# Patient Record
Sex: FEMALE | Race: BLACK OR AFRICAN AMERICAN | NOT HISPANIC OR LATINO
[De-identification: names, ages, dates, MRNs, and addresses within clinical notes are randomized per-mention and may not be internally consistent; named-entity substitution may affect disease eponyms.]

---

## 2018-06-01 PROBLEM — Z00.00 ENCOUNTER FOR PREVENTIVE HEALTH EXAMINATION: Status: ACTIVE | Noted: 2018-06-01

## 2018-06-07 ENCOUNTER — APPOINTMENT (OUTPATIENT)
Dept: VASCULAR SURGERY | Facility: CLINIC | Age: 68
End: 2018-06-07
Payer: MEDICARE

## 2018-06-07 VITALS
HEART RATE: 73 BPM | SYSTOLIC BLOOD PRESSURE: 193 MMHG | HEIGHT: 61 IN | TEMPERATURE: 98 F | WEIGHT: 152 LBS | BODY MASS INDEX: 28.7 KG/M2 | DIASTOLIC BLOOD PRESSURE: 97 MMHG

## 2018-06-07 PROCEDURE — 99203 OFFICE O/P NEW LOW 30 MIN: CPT

## 2018-06-19 ENCOUNTER — APPOINTMENT (OUTPATIENT)
Dept: VASCULAR SURGERY | Facility: CLINIC | Age: 68
End: 2018-06-19
Payer: MEDICARE

## 2018-06-19 PROCEDURE — 93924 LWR XTR VASC STDY BILAT: CPT

## 2018-08-31 ENCOUNTER — EMERGENCY (EMERGENCY)
Facility: HOSPITAL | Age: 68
LOS: 1 days | Discharge: HOME | End: 2018-08-31
Attending: EMERGENCY MEDICINE | Admitting: EMERGENCY MEDICINE
Payer: MEDICARE

## 2018-08-31 VITALS
SYSTOLIC BLOOD PRESSURE: 154 MMHG | TEMPERATURE: 101 F | DIASTOLIC BLOOD PRESSURE: 80 MMHG | HEART RATE: 89 BPM | OXYGEN SATURATION: 100 % | RESPIRATION RATE: 16 BRPM

## 2018-08-31 VITALS
SYSTOLIC BLOOD PRESSURE: 164 MMHG | HEART RATE: 85 BPM | OXYGEN SATURATION: 100 % | RESPIRATION RATE: 18 BRPM | DIASTOLIC BLOOD PRESSURE: 84 MMHG

## 2018-08-31 LAB
ALBUMIN SERPL ELPH-MCNC: 4.2 G/DL — SIGNIFICANT CHANGE UP (ref 3.3–5)
ALP SERPL-CCNC: 163 U/L — HIGH (ref 40–120)
ALT FLD-CCNC: 34 U/L — HIGH (ref 4–33)
APPEARANCE UR: CLEAR — SIGNIFICANT CHANGE UP
AST SERPL-CCNC: 42 U/L — HIGH (ref 4–32)
BACTERIA # UR AUTO: SIGNIFICANT CHANGE UP
BASE EXCESS BLDV CALC-SCNC: 2.3 MMOL/L — SIGNIFICANT CHANGE UP
BASOPHILS # BLD AUTO: 0.06 K/UL — SIGNIFICANT CHANGE UP (ref 0–0.2)
BASOPHILS NFR BLD AUTO: 0.6 % — SIGNIFICANT CHANGE UP (ref 0–2)
BILIRUB SERPL-MCNC: 0.8 MG/DL — SIGNIFICANT CHANGE UP (ref 0.2–1.2)
BILIRUB UR-MCNC: NEGATIVE — SIGNIFICANT CHANGE UP
BLOOD GAS VENOUS - CREATININE: 1.07 MG/DL — SIGNIFICANT CHANGE UP (ref 0.5–1.3)
BLOOD UR QL VISUAL: SIGNIFICANT CHANGE UP
BUN SERPL-MCNC: 29 MG/DL — HIGH (ref 7–23)
CALCIUM SERPL-MCNC: 9.6 MG/DL — SIGNIFICANT CHANGE UP (ref 8.4–10.5)
CHLORIDE BLDV-SCNC: 102 MMOL/L — SIGNIFICANT CHANGE UP (ref 96–108)
CHLORIDE SERPL-SCNC: 93 MMOL/L — LOW (ref 98–107)
CO2 SERPL-SCNC: 23 MMOL/L — SIGNIFICANT CHANGE UP (ref 22–31)
COLOR SPEC: YELLOW — SIGNIFICANT CHANGE UP
CREAT SERPL-MCNC: 1.14 MG/DL — SIGNIFICANT CHANGE UP (ref 0.5–1.3)
EOSINOPHIL # BLD AUTO: 0.05 K/UL — SIGNIFICANT CHANGE UP (ref 0–0.5)
EOSINOPHIL NFR BLD AUTO: 0.5 % — SIGNIFICANT CHANGE UP (ref 0–6)
GAS PNL BLDV: 136 MMOL/L — SIGNIFICANT CHANGE UP (ref 136–146)
GLUCOSE BLDV-MCNC: 173 — HIGH (ref 70–99)
GLUCOSE SERPL-MCNC: 170 MG/DL — HIGH (ref 70–99)
GLUCOSE UR-MCNC: NEGATIVE — SIGNIFICANT CHANGE UP
HCO3 BLDV-SCNC: 27 MMOL/L — SIGNIFICANT CHANGE UP (ref 20–27)
HCT VFR BLD CALC: 41.3 % — SIGNIFICANT CHANGE UP (ref 34.5–45)
HCT VFR BLDV CALC: 42.4 % — SIGNIFICANT CHANGE UP (ref 34.5–45)
HGB BLD-MCNC: 14.3 G/DL — SIGNIFICANT CHANGE UP (ref 11.5–15.5)
HGB BLDV-MCNC: 13.8 G/DL — SIGNIFICANT CHANGE UP (ref 11.5–15.5)
HYALINE CASTS # UR AUTO: HIGH
IMM GRANULOCYTES # BLD AUTO: 0.04 # — SIGNIFICANT CHANGE UP
IMM GRANULOCYTES NFR BLD AUTO: 0.4 % — SIGNIFICANT CHANGE UP (ref 0–1.5)
KETONES UR-MCNC: NEGATIVE — SIGNIFICANT CHANGE UP
LACTATE BLDV-MCNC: 2.2 MMOL/L — HIGH (ref 0.5–2)
LEUKOCYTE ESTERASE UR-ACNC: NEGATIVE — SIGNIFICANT CHANGE UP
LYMPHOCYTES # BLD AUTO: 3.66 K/UL — HIGH (ref 1–3.3)
LYMPHOCYTES # BLD AUTO: 33.6 % — SIGNIFICANT CHANGE UP (ref 13–44)
MCHC RBC-ENTMCNC: 32.7 PG — SIGNIFICANT CHANGE UP (ref 27–34)
MCHC RBC-ENTMCNC: 34.6 % — SIGNIFICANT CHANGE UP (ref 32–36)
MCV RBC AUTO: 94.5 FL — SIGNIFICANT CHANGE UP (ref 80–100)
MONOCYTES # BLD AUTO: 1.04 K/UL — HIGH (ref 0–0.9)
MONOCYTES NFR BLD AUTO: 9.6 % — SIGNIFICANT CHANGE UP (ref 2–14)
MUCOUS THREADS # UR AUTO: SIGNIFICANT CHANGE UP
NEUTROPHILS # BLD AUTO: 6.04 K/UL — SIGNIFICANT CHANGE UP (ref 1.8–7.4)
NEUTROPHILS NFR BLD AUTO: 55.3 % — SIGNIFICANT CHANGE UP (ref 43–77)
NITRITE UR-MCNC: NEGATIVE — SIGNIFICANT CHANGE UP
NRBC # FLD: 0 — SIGNIFICANT CHANGE UP
PCO2 BLDV: 37 MMHG — LOW (ref 41–51)
PH BLDV: 7.46 PH — HIGH (ref 7.32–7.43)
PH UR: 6 — SIGNIFICANT CHANGE UP (ref 5–8)
PLATELET # BLD AUTO: 442 K/UL — HIGH (ref 150–400)
PMV BLD: 9.9 FL — SIGNIFICANT CHANGE UP (ref 7–13)
PO2 BLDV: 70 MMHG — HIGH (ref 35–40)
POTASSIUM BLDV-SCNC: 3.3 MMOL/L — LOW (ref 3.4–4.5)
POTASSIUM SERPL-MCNC: 3.8 MMOL/L — SIGNIFICANT CHANGE UP (ref 3.5–5.3)
POTASSIUM SERPL-SCNC: 3.8 MMOL/L — SIGNIFICANT CHANGE UP (ref 3.5–5.3)
PROT SERPL-MCNC: 9 G/DL — HIGH (ref 6–8.3)
PROT UR-MCNC: SIGNIFICANT CHANGE UP
RBC # BLD: 4.37 M/UL — SIGNIFICANT CHANGE UP (ref 3.8–5.2)
RBC # FLD: 11.9 % — SIGNIFICANT CHANGE UP (ref 10.3–14.5)
RBC CASTS # UR COMP ASSIST: SIGNIFICANT CHANGE UP (ref 0–?)
SAO2 % BLDV: 95.4 % — HIGH (ref 60–85)
SODIUM SERPL-SCNC: 135 MMOL/L — SIGNIFICANT CHANGE UP (ref 135–145)
SP GR SPEC: 1.03 — SIGNIFICANT CHANGE UP (ref 1–1.04)
SQUAMOUS # UR AUTO: SIGNIFICANT CHANGE UP
UROBILINOGEN FLD QL: SIGNIFICANT CHANGE UP
WBC # BLD: 10.89 K/UL — HIGH (ref 3.8–10.5)
WBC # FLD AUTO: 10.89 K/UL — HIGH (ref 3.8–10.5)
WBC UR QL: SIGNIFICANT CHANGE UP (ref 0–?)

## 2018-08-31 PROCEDURE — 99283 EMERGENCY DEPT VISIT LOW MDM: CPT | Mod: GC

## 2018-08-31 PROCEDURE — 71046 X-RAY EXAM CHEST 2 VIEWS: CPT | Mod: 26

## 2018-08-31 RX ORDER — SODIUM CHLORIDE 9 MG/ML
1000 INJECTION INTRAMUSCULAR; INTRAVENOUS; SUBCUTANEOUS ONCE
Qty: 0 | Refills: 0 | Status: COMPLETED | OUTPATIENT
Start: 2018-08-31 | End: 2018-08-31

## 2018-08-31 RX ORDER — ACETAMINOPHEN 500 MG
650 TABLET ORAL ONCE
Qty: 0 | Refills: 0 | Status: COMPLETED | OUTPATIENT
Start: 2018-08-31 | End: 2018-08-31

## 2018-08-31 RX ADMIN — Medication 650 MILLIGRAM(S): at 22:01

## 2018-08-31 RX ADMIN — SODIUM CHLORIDE 1000 MILLILITER(S): 9 INJECTION INTRAMUSCULAR; INTRAVENOUS; SUBCUTANEOUS at 22:01

## 2018-08-31 NOTE — ED PROVIDER NOTE - CARE PLAN
Principal Discharge DX:	Fever of unknown origin (FUO)  Assessment and plan of treatment:	1. Please follow up with your PMD In 1-3 days. Please let her know you were seen in the emergency room for fever. Bring a copy of your results. You were decided not to be treated because there is no obvious source at this time. We will follow up on your urine culture and blood cultures.  2. Please establish care with an Infectious Disease doctor (using the attached list) or Call [phone] to find a doctor affiliated with [facility] in your neighborhood & network.   3. Please return to the ED should you have any new or worsening symptoms or have any new concerns as discussed.  4. Read all attached.

## 2018-08-31 NOTE — ED PROVIDER NOTE - PLAN OF CARE
1. Please follow up with your PMD In 1-3 days. Please let her know you were seen in the emergency room for fever. Bring a copy of your results. You were decided not to be treated because there is no obvious source at this time. We will follow up on your urine culture and blood cultures.  2. Please establish care with an Infectious Disease doctor (using the attached list) or Call [phone] to find a doctor affiliated with [facility] in your neighborhood & network.   3. Please return to the ED should you have any new or worsening symptoms or have any new concerns as discussed.  4. Read all attached.

## 2018-08-31 NOTE — ED ADULT NURSE NOTE - NSIMPLEMENTINTERV_GEN_ALL_ED
Implemented All Universal Safety Interventions:  [place] to call system. Call bell, personal items and telephone within reach. Instruct patient to call for assistance. Room bathroom lighting operational. Non-slip footwear when patient is off stretcher. Physically safe environment: no spills, clutter or unnecessary equipment. Stretcher in lowest position, wheels locked, appropriate side rails in place.

## 2018-08-31 NOTE — ED PROVIDER NOTE - PROGRESS NOTE DETAILS
Woody:  patient well appearing, no focal findings on tests except mildly elevated LFT.  Follow up with PCP outpatient, recommend ID, and given return precautions.

## 2018-08-31 NOTE — ED PROVIDER NOTE - ATTENDING CONTRIBUTION TO CARE
Dr. Mckay:  I have personally performed a face to face bedside history and physical examination of this patient. I have discussed the history, examination, review of systems, assessment and plan of management with the resident. I have reviewed the electronic medical record and amended it to reflect my history, review of systems, physical exam, assessment and plan.    68F h/o HTN and gout presents with two weeks of fever, Tm 102 at home.  +somewhat more frequent urination.  Denies URI sx, cp, sob, n/v/d, dysuria, recent travel, sick contact.  Saw her PCP Dr. Coronado yesterday and had testing, told LFT somewhat elevated, pending hepatitis panel.  Also c/o intermittent left flank pain.    Exam:  - nad  - rrr  - ctab  - abd soft ntnd, no cvat    A/P  - fever  - cbc, cmp, ua, urine culture, vbg, blood cultures x 2   - cxr  - ekg

## 2018-08-31 NOTE — ED PROVIDER NOTE - PHYSICAL EXAMINATION
Tasha Krishna, DO:   Gen: Well appearing & smiling NAD  Head: NCAT  HEENT: PERRL, MMM, normal conjunctiva, anicteric, neck supple  Lung: CTAB, no adventitious sounds  CV: RRR, no murmurs  Abd: soft, NTND, no rebound or guarding, no CVAT  MSK: No edema, no visible deformities  Neuro: Ambulatory with stable gait.   Skin: Warm and dry, no evidence of rash  Psych: normal mood and affect

## 2018-08-31 NOTE — ED ADULT NURSE NOTE - OBJECTIVE STATEMENT
pt aox4 states she is having fevers for 2 weeks; followed up with pmd and states she had blood work done but is not prescribed any medications. Pt presents in no respiratory distress. Denies chest pain denies sob. Reports left flank pain.  VSS; temp 99.8 here. IV 18g placed in left ac labs and cultures sent. MD Mckay at bedside for eval. Will continue to monitor/assess

## 2018-08-31 NOTE — ED ADULT TRIAGE NOTE - CHIEF COMPLAINT QUOTE
Fevers at home x 2 weeks tmax 102.4. Denies n/v/d. Denies sick contacts, recent travel. Denies CP, SOb. Endorsing left flank pain x 3 weeks, polyuria. Hx HTN

## 2018-08-31 NOTE — ED PROVIDER NOTE - OBJECTIVE STATEMENT
Tasha Erendira, DO: 68F with hx of HTN & gout here for fever x 19 days with tmax 102.6. No recent travel. Daughter endorsing NP cough.  No HA, neck pain, chest pain, cough, abdominal pain, diarrhea, urinary symptoms, rash, myalgias, arthralgias, edema. Endorsing left flank pain x 3 weeks, polyuria.     PMD: Dr. Faustina Coronado

## 2018-09-01 LAB
SPECIMEN SOURCE: SIGNIFICANT CHANGE UP
SPECIMEN SOURCE: SIGNIFICANT CHANGE UP

## 2018-09-02 LAB
BACTERIA UR CULT: SIGNIFICANT CHANGE UP
SPECIMEN SOURCE: SIGNIFICANT CHANGE UP

## 2018-09-03 ENCOUNTER — INPATIENT (INPATIENT)
Facility: HOSPITAL | Age: 68
LOS: 3 days | Discharge: HOME | End: 2018-09-07
Attending: HOSPITALIST | Admitting: HOSPITALIST
Payer: MEDICARE

## 2018-09-03 VITALS
SYSTOLIC BLOOD PRESSURE: 141 MMHG | DIASTOLIC BLOOD PRESSURE: 73 MMHG | HEART RATE: 72 BPM | OXYGEN SATURATION: 99 % | RESPIRATION RATE: 20 BRPM | TEMPERATURE: 98 F

## 2018-09-03 DIAGNOSIS — R78.81 BACTEREMIA: ICD-10-CM

## 2018-09-03 DIAGNOSIS — I10 ESSENTIAL (PRIMARY) HYPERTENSION: ICD-10-CM

## 2018-09-03 DIAGNOSIS — R63.8 OTHER SYMPTOMS AND SIGNS CONCERNING FOOD AND FLUID INTAKE: ICD-10-CM

## 2018-09-03 DIAGNOSIS — Z29.9 ENCOUNTER FOR PROPHYLACTIC MEASURES, UNSPECIFIED: ICD-10-CM

## 2018-09-03 LAB
ALBUMIN SERPL ELPH-MCNC: 4.3 G/DL — SIGNIFICANT CHANGE UP (ref 3.3–5)
ALP SERPL-CCNC: 134 U/L — HIGH (ref 40–120)
ALT FLD-CCNC: 19 U/L — SIGNIFICANT CHANGE UP (ref 4–33)
AST SERPL-CCNC: 19 U/L — SIGNIFICANT CHANGE UP (ref 4–32)
BASE EXCESS BLDV CALC-SCNC: 5.6 MMOL/L — SIGNIFICANT CHANGE UP
BASOPHILS # BLD AUTO: 0.05 K/UL — SIGNIFICANT CHANGE UP (ref 0–0.2)
BASOPHILS NFR BLD AUTO: 0.6 % — SIGNIFICANT CHANGE UP (ref 0–2)
BILIRUB SERPL-MCNC: 0.4 MG/DL — SIGNIFICANT CHANGE UP (ref 0.2–1.2)
BLOOD GAS VENOUS - CREATININE: 1.07 MG/DL — SIGNIFICANT CHANGE UP (ref 0.5–1.3)
BUN SERPL-MCNC: 27 MG/DL — HIGH (ref 7–23)
CALCIUM SERPL-MCNC: 9.8 MG/DL — SIGNIFICANT CHANGE UP (ref 8.4–10.5)
CHLORIDE BLDV-SCNC: 102 MMOL/L — SIGNIFICANT CHANGE UP (ref 96–108)
CHLORIDE SERPL-SCNC: 96 MMOL/L — LOW (ref 98–107)
CO2 SERPL-SCNC: 28 MMOL/L — SIGNIFICANT CHANGE UP (ref 22–31)
CREAT SERPL-MCNC: 1.11 MG/DL — SIGNIFICANT CHANGE UP (ref 0.5–1.3)
EOSINOPHIL # BLD AUTO: 0.14 K/UL — SIGNIFICANT CHANGE UP (ref 0–0.5)
EOSINOPHIL NFR BLD AUTO: 1.7 % — SIGNIFICANT CHANGE UP (ref 0–6)
GAS PNL BLDV: 138 MMOL/L — SIGNIFICANT CHANGE UP (ref 136–146)
GLUCOSE BLDV-MCNC: 150 — HIGH (ref 70–99)
GLUCOSE SERPL-MCNC: 142 MG/DL — HIGH (ref 70–99)
HCO3 BLDV-SCNC: 27 MMOL/L — SIGNIFICANT CHANGE UP (ref 20–27)
HCT VFR BLD CALC: 39.4 % — SIGNIFICANT CHANGE UP (ref 34.5–45)
HCT VFR BLDV CALC: 44.4 % — SIGNIFICANT CHANGE UP (ref 34.5–45)
HGB BLD-MCNC: 13.6 G/DL — SIGNIFICANT CHANGE UP (ref 11.5–15.5)
HGB BLDV-MCNC: 14.5 G/DL — SIGNIFICANT CHANGE UP (ref 11.5–15.5)
IMM GRANULOCYTES # BLD AUTO: 0.03 # — SIGNIFICANT CHANGE UP
IMM GRANULOCYTES NFR BLD AUTO: 0.4 % — SIGNIFICANT CHANGE UP (ref 0–1.5)
LACTATE BLDV-MCNC: 1.6 MMOL/L — SIGNIFICANT CHANGE UP (ref 0.5–2)
LYMPHOCYTES # BLD AUTO: 3.64 K/UL — HIGH (ref 1–3.3)
LYMPHOCYTES # BLD AUTO: 43.4 % — SIGNIFICANT CHANGE UP (ref 13–44)
MCHC RBC-ENTMCNC: 32.8 PG — SIGNIFICANT CHANGE UP (ref 27–34)
MCHC RBC-ENTMCNC: 34.5 % — SIGNIFICANT CHANGE UP (ref 32–36)
MCV RBC AUTO: 94.9 FL — SIGNIFICANT CHANGE UP (ref 80–100)
METHOD TYPE: SIGNIFICANT CHANGE UP
MONOCYTES # BLD AUTO: 0.61 K/UL — SIGNIFICANT CHANGE UP (ref 0–0.9)
MONOCYTES NFR BLD AUTO: 7.3 % — SIGNIFICANT CHANGE UP (ref 2–14)
NEUTROPHILS # BLD AUTO: 3.92 K/UL — SIGNIFICANT CHANGE UP (ref 1.8–7.4)
NEUTROPHILS NFR BLD AUTO: 46.6 % — SIGNIFICANT CHANGE UP (ref 43–77)
NRBC # FLD: 0 — SIGNIFICANT CHANGE UP
ORGANISM # SPEC MICROSCOPIC CNT: SIGNIFICANT CHANGE UP
ORGANISM # SPEC MICROSCOPIC CNT: SIGNIFICANT CHANGE UP
PCO2 BLDV: 52 MMHG — HIGH (ref 41–51)
PH BLDV: 7.39 PH — SIGNIFICANT CHANGE UP (ref 7.32–7.43)
PLATELET # BLD AUTO: 424 K/UL — HIGH (ref 150–400)
PMV BLD: 9.9 FL — SIGNIFICANT CHANGE UP (ref 7–13)
PO2 BLDV: 29 MMHG — LOW (ref 35–40)
POTASSIUM BLDV-SCNC: 3.2 MMOL/L — LOW (ref 3.4–4.5)
POTASSIUM SERPL-MCNC: 3.5 MMOL/L — SIGNIFICANT CHANGE UP (ref 3.5–5.3)
POTASSIUM SERPL-SCNC: 3.5 MMOL/L — SIGNIFICANT CHANGE UP (ref 3.5–5.3)
PROT SERPL-MCNC: 8.4 G/DL — HIGH (ref 6–8.3)
RBC # BLD: 4.15 M/UL — SIGNIFICANT CHANGE UP (ref 3.8–5.2)
RBC # FLD: 12 % — SIGNIFICANT CHANGE UP (ref 10.3–14.5)
SAO2 % BLDV: 50.8 % — LOW (ref 60–85)
SODIUM SERPL-SCNC: 140 MMOL/L — SIGNIFICANT CHANGE UP (ref 135–145)
WBC # BLD: 8.39 K/UL — SIGNIFICANT CHANGE UP (ref 3.8–10.5)
WBC # FLD AUTO: 8.39 K/UL — SIGNIFICANT CHANGE UP (ref 3.8–10.5)

## 2018-09-03 PROCEDURE — 99223 1ST HOSP IP/OBS HIGH 75: CPT | Mod: GC

## 2018-09-03 RX ORDER — HYDROCHLOROTHIAZIDE 25 MG
25 TABLET ORAL DAILY
Qty: 0 | Refills: 0 | Status: DISCONTINUED | OUTPATIENT
Start: 2018-09-03 | End: 2018-09-04

## 2018-09-03 RX ORDER — ATENOLOL AND CHLORTHALIDONE 50; 25 MG/1; MG/1
1 TABLET ORAL
Qty: 0 | Refills: 0 | COMMUNITY

## 2018-09-03 RX ORDER — INFLUENZA VIRUS VACCINE 15; 15; 15; 15 UG/.5ML; UG/.5ML; UG/.5ML; UG/.5ML
0.5 SUSPENSION INTRAMUSCULAR ONCE
Qty: 0 | Refills: 0 | Status: DISCONTINUED | OUTPATIENT
Start: 2018-09-03 | End: 2018-09-07

## 2018-09-03 RX ORDER — PIPERACILLIN AND TAZOBACTAM 4; .5 G/20ML; G/20ML
3.38 INJECTION, POWDER, LYOPHILIZED, FOR SOLUTION INTRAVENOUS EVERY 8 HOURS
Qty: 0 | Refills: 0 | Status: DISCONTINUED | OUTPATIENT
Start: 2018-09-03 | End: 2018-09-07

## 2018-09-03 RX ORDER — PIPERACILLIN AND TAZOBACTAM 4; .5 G/20ML; G/20ML
3.38 INJECTION, POWDER, LYOPHILIZED, FOR SOLUTION INTRAVENOUS ONCE
Qty: 0 | Refills: 0 | Status: COMPLETED | OUTPATIENT
Start: 2018-09-03 | End: 2018-09-03

## 2018-09-03 RX ORDER — ENOXAPARIN SODIUM 100 MG/ML
40 INJECTION SUBCUTANEOUS DAILY
Qty: 0 | Refills: 0 | Status: DISCONTINUED | OUTPATIENT
Start: 2018-09-03 | End: 2018-09-07

## 2018-09-03 RX ORDER — ATENOLOL 25 MG/1
50 TABLET ORAL DAILY
Qty: 0 | Refills: 0 | Status: DISCONTINUED | OUTPATIENT
Start: 2018-09-03 | End: 2018-09-04

## 2018-09-03 RX ADMIN — PIPERACILLIN AND TAZOBACTAM 200 GRAM(S): 4; .5 INJECTION, POWDER, LYOPHILIZED, FOR SOLUTION INTRAVENOUS at 16:29

## 2018-09-03 RX ADMIN — PIPERACILLIN AND TAZOBACTAM 25 GRAM(S): 4; .5 INJECTION, POWDER, LYOPHILIZED, FOR SOLUTION INTRAVENOUS at 21:15

## 2018-09-03 NOTE — ED PROVIDER NOTE - OBJECTIVE STATEMENT
69 y/o F PMH HTN called back to ED today for + blood cx at 54 hours with gram negative rods. Pt was evaluated 2 days ago for FUO (tmax 102F), no fever today, yesterday tmax 100F. Pt states her sxs have gradually improved over past 2 days. States occasional np cough now resolved, urinary sxs completely resolved as well. Denies chills, bodyaches, CP, SOB, abdominal pain, N/V/D, dysuria, hematuria, frequency, ha/neck pain.

## 2018-09-03 NOTE — H&P ADULT - NSHPLABSRESULTS_GEN_ALL_CORE
09-03    140  |  96<L>  |  27<H>  ----------------------------<  142<H>  3.5   |  28  |  1.11    Ca    9.8      03 Sep 2018 16:40    TPro  8.4<H>  /  Alb  4.3  /  TBili  0.4  /  DBili  x   /  AST  19  /  ALT  19  /  AlkPhos  134<H>  09-03  TPro  9.0<H>  /  Alb  4.2  /  TBili  0.8  /  DBili  x   /  AST  42<H>  /  ALT  34<H>  /  AlkPhos  163<H>  08-31                        13.6   8.39  )-----------( 424      ( 03 Sep 2018 16:10 )             39.4             CAPILLARY BLOOD GLUCOSE

## 2018-09-03 NOTE — ED PROVIDER NOTE - MEDICAL DECISION MAKING DETAILS
67 y/o F with + blood cx after 54 hours with gram negative rods; sxs otherwise resolving  -cbc, cmp, blood cx x2, vbg

## 2018-09-03 NOTE — ED PROVIDER NOTE - ATTENDING CONTRIBUTION TO CARE
Di: 69 yo female called back for positive blood cultures. Pt seen 2 days ago in the ED for fever of unknown origin. Blood cultures were sent which today gre gómez negative rods. Pt denies cough, congestions, dysuria. + urinary frequency. NO nausea or vomiting. Pt has not had any further fevers since her d/c form the ED. Pt currently asymptomatic. EXam: GENERAL: well appearing, NAD, HEENT: MMM, CARDIO: +S1/S2, no murmurs, rubs or gallops, LUNGS: CTA B/L, no wheezing, rales or rhonchi, ABD: soft, nontender, BSx4 quadrants, no guarding or rigidity. MSK: NO CVA TTP NEURO: AxOx3, SKIN: no rashes or lesions, well perfused A/P- 69 yo female with positive blood cultures. will obtain labs, repeat cultures, give iv abx, and admit.

## 2018-09-03 NOTE — H&P ADULT - NSHPPHYSICALEXAM_GEN_ALL_CORE
Vital Signs Last 24 Hrs  T(C): 36.9 (09-03-18 @ 17:41), Max: 36.9 (09-03-18 @ 17:41)  T(F): 98.5 (09-03-18 @ 17:41), Max: 98.5 (09-03-18 @ 17:41)  HR: 65 (09-03-18 @ 17:41) (65 - 72)  BP: 143/54 (09-03-18 @ 17:41) (141/73 - 143/54)  RR: 19 (09-03-18 @ 17:41) (19 - 20)  SpO2: 100% (09-03-18 @ 17:41) (99% - 100%)    PHYSICAL EXAM:  GENERAL: NAD, well-groomed, well-developed  HEAD:  Atraumatic, Normocephalic  EYES: EOMI, PERRLA, conjunctiva and sclera clear  ENMT: No tonsillar erythema, exudates, or enlargement; Moist mucous membranes, Good dentition, No lesions  NECK: Supple, No JVD, Normal thyroid  CHEST/LUNG: Clear to percussion bilaterally; No rales, rhonchi, wheezing, or rubs  HEART: Regular rate and rhythm; No murmurs, rubs, or gallops  ABDOMEN: Soft, Nontender, Nondistended; Bowel sounds present  EXTREMITIES:  2+ Peripheral Pulses, No clubbing, cyanosis, or edema  LYMPH: No lymphadenopathy noted  SKIN: No rashes or lesions  NERVOUS SYSTEM:  Alert & Oriented X3, Good concentration; Motor Strength 5/5 B/L upper and lower extremities; DTRs 2+ intact and symmetric

## 2018-09-03 NOTE — H&P ADULT - PROBLEM SELECTOR PLAN 1
Pt called back b/c of GNR bacteremia after being discharged from the ED; pt is afebrile, w/o leukocytosis or overt physical exam findings suggestive of source  - f/u UA  - f/u BCx x2  - can consider CT A/P w/ IV contrast to look for source  - would empirically treat with Zosyn until repeat cultures are cleared

## 2018-09-03 NOTE — ED POST DISCHARGE NOTE - DETAILS
[phone] - unable to leave message; phone rings [phone] - left message to call back. [phone] - PMD office closed

## 2018-09-03 NOTE — ED ADULT NURSE NOTE - CHPI ED NUR SYMPTOMS NEG
no nausea/no tingling/no vomiting/no dizziness/no fever/no decreased eating/drinking/no weakness/no pain/no chills

## 2018-09-03 NOTE — ED ADULT NURSE NOTE - NSIMPLEMENTINTERV_GEN_ALL_ED
Implemented All Universal Safety Interventions:  [place] to call system. Call bell, personal items and telephone within reach. Instruct patient to call for assistance. Room bathroom lighting operational. Non-slip footwear when patient is off stretcher. Physically safe environment: no spills, clutter or unnecessary equipment. Stretcher in lowest position, wheels locked, appropriate side rails in place.

## 2018-09-03 NOTE — ED ADULT NURSE NOTE - OBJECTIVE STATEMENT
69 y/o female presents to ED after being called back.  Pt was seen in ED on Friday for fever of unknown origin, pt had blood cultures drawn which were called back as positive for gram neg rods.  Pt states that her original symptoms have resolved and she is feeling much better, pt offers no complaints.  IV established, labs drawn and sent, providers evaluating.

## 2018-09-03 NOTE — H&P ADULT - HISTORY OF PRESENT ILLNESS
69 y/o F PMH HTN called back to ED today for BCx at 54 hours + for GNR.   Pt was evaluated 2 days ago for fever of unclear etiology w/ TMax 102 with 19 days of fever prior to coming into the hospital; did not have fever today but had T 100 yesterday.  Pt states her sxs have gradually improved over past 2 days. Describes occasional non-productive cough now resolved, urinary sxs of L flank pain x 3 weeks and polyuria are completely resolved as well.     ROS negative    No recent travel or sick contacts    In the ED:  Vital Signs Last 24 Hrs  T(C): 36.9 (09-03-18 @ 17:41), Max: 36.9 (09-03-18 @ 17:41)  T(F): 98.5 (09-03-18 @ 17:41), Max: 98.5 (09-03-18 @ 17:41)  HR: 65 (09-03-18 @ 17:41) (65 - 72)  BP: 143/54 (09-03-18 @ 17:41) (141/73 - 143/54)  BP(mean): --  RR: 19 (09-03-18 @ 17:41) (19 - 20)  SpO2: 100% (09-03-18 @ 17:41) (99% - 100%) 67 y/o F PMH HTN called back to ED today for BCx at 54 hours + for GNR in 1 bottle.  Pt was evaluated 2 days ago in the ED for fever of unclear etiology w/ TMax 102 with 19 days of fever prior to coming into the hospital; did not have fever today but had Tmax 100 yesterday. Pt states her symptoms have gradually improved over past 2 days. Describes occasional non-productive cough which has now resolved, along with urinary symptoms consisting of L flank pain x 3 weeks and polyuria, which have now completely resolved as well. Patient denies any recent travel or sick contacts. No abdominal pain, diarrhea, dysuria, SOB, chest pain, or [place]/vomiting.    In the ED:  Vital Signs Last 24 Hrs  T(C): 36.9 (09-03-18 @ 17:41), Max: 36.9 (09-03-18 @ 17:41)  T(F): 98.5 (09-03-18 @ 17:41), Max: 98.5 (09-03-18 @ 17:41)  HR: 65 (09-03-18 @ 17:41) (65 - 72)  BP: 143/54 (09-03-18 @ 17:41) (141/73 - 143/54)  BP(mean): --  RR: 19 (09-03-18 @ 17:41) (19 - 20)  SpO2: 100% (09-03-18 @ 17:41) (99% - 100%) 67 y/o F PMH HTN called back to ED today for BCx at 54 hours + for GNR in 1 bottle.  Pt was evaluated 2 days ago in the ED for fever of unclear etiology w/ TMax 102 with 19 days of fever prior to coming into the hospital; did not have fever today but had Tmax 100 yesterday. Pt states her symptoms have gradually improved over past 2 days. Describes occasional non-productive cough which has now resolved, along with urinary symptoms consisting of L flank pain x 3 weeks and polyuria, which have now completely resolved as well. Patient denies any recent travel or sick contacts.  No recent dental work. No abdominal pain, diarrhea, dysuria, SOB, chest pain, or [place]/vomiting.    In the ED:  Vital Signs Last 24 Hrs  T(C): 36.9 (09-03-18 @ 17:41), Max: 36.9 (09-03-18 @ 17:41)  T(F): 98.5 (09-03-18 @ 17:41), Max: 98.5 (09-03-18 @ 17:41)  HR: 65 (09-03-18 @ 17:41) (65 - 72)  BP: 143/54 (09-03-18 @ 17:41) (141/73 - 143/54)  BP(mean): --  RR: 19 (09-03-18 @ 17:41) (19 - 20)  SpO2: 100% (09-03-18 @ 17:41) (99% - 100%)

## 2018-09-03 NOTE — H&P ADULT - ASSESSMENT
7 y/o F PMH HTN returning to ED today after being found to have GNR bacteremia on previous BCx drawn 2 days ago in the setting of 19 days of fever of unclear etiology 7 y/o F PMH HTN returning to ED today after being found to have GNR bacteremia on previous BCx drawn 2 days ago in the setting of 19 days of fever of unclear etiology. Possible sources of infection include GI vs UTI. 69 y/o F PMH HTN returning to ED today after being found to have GNR bacteremia on previous BCx drawn 2 days ago in the setting of 19 days of fever of unclear etiology. Possible sources of infection include GI vs UTI.

## 2018-09-04 DIAGNOSIS — E87.6 HYPOKALEMIA: ICD-10-CM

## 2018-09-04 LAB
ALBUMIN SERPL ELPH-MCNC: 3.7 G/DL — SIGNIFICANT CHANGE UP (ref 3.3–5)
ALP SERPL-CCNC: 104 U/L — SIGNIFICANT CHANGE UP (ref 40–120)
ALT FLD-CCNC: 16 U/L — SIGNIFICANT CHANGE UP (ref 4–33)
APTT BLD: 29.4 SEC — SIGNIFICANT CHANGE UP (ref 27.5–37.4)
AST SERPL-CCNC: 17 U/L — SIGNIFICANT CHANGE UP (ref 4–32)
BASOPHILS # BLD AUTO: 0.05 K/UL — SIGNIFICANT CHANGE UP (ref 0–0.2)
BASOPHILS NFR BLD AUTO: 0.6 % — SIGNIFICANT CHANGE UP (ref 0–2)
BILIRUB SERPL-MCNC: 0.8 MG/DL — SIGNIFICANT CHANGE UP (ref 0.2–1.2)
BUN SERPL-MCNC: 21 MG/DL — SIGNIFICANT CHANGE UP (ref 7–23)
CALCIUM SERPL-MCNC: 9.1 MG/DL — SIGNIFICANT CHANGE UP (ref 8.4–10.5)
CHLORIDE SERPL-SCNC: 95 MMOL/L — LOW (ref 98–107)
CO2 SERPL-SCNC: 27 MMOL/L — SIGNIFICANT CHANGE UP (ref 22–31)
CREAT SERPL-MCNC: 0.98 MG/DL — SIGNIFICANT CHANGE UP (ref 0.5–1.3)
EOSINOPHIL # BLD AUTO: 0.09 K/UL — SIGNIFICANT CHANGE UP (ref 0–0.5)
EOSINOPHIL NFR BLD AUTO: 1.1 % — SIGNIFICANT CHANGE UP (ref 0–6)
GLUCOSE SERPL-MCNC: 147 MG/DL — HIGH (ref 70–99)
HCT VFR BLD CALC: 35.4 % — SIGNIFICANT CHANGE UP (ref 34.5–45)
HGB BLD-MCNC: 12.5 G/DL — SIGNIFICANT CHANGE UP (ref 11.5–15.5)
IMM GRANULOCYTES # BLD AUTO: 0.04 # — SIGNIFICANT CHANGE UP
IMM GRANULOCYTES NFR BLD AUTO: 0.5 % — SIGNIFICANT CHANGE UP (ref 0–1.5)
INR BLD: 1.1 — SIGNIFICANT CHANGE UP (ref 0.88–1.17)
LYMPHOCYTES # BLD AUTO: 2.86 K/UL — SIGNIFICANT CHANGE UP (ref 1–3.3)
LYMPHOCYTES # BLD AUTO: 35 % — SIGNIFICANT CHANGE UP (ref 13–44)
MAGNESIUM SERPL-MCNC: 2.3 MG/DL — SIGNIFICANT CHANGE UP (ref 1.6–2.6)
MCHC RBC-ENTMCNC: 33.2 PG — SIGNIFICANT CHANGE UP (ref 27–34)
MCHC RBC-ENTMCNC: 35.3 % — SIGNIFICANT CHANGE UP (ref 32–36)
MCV RBC AUTO: 94.1 FL — SIGNIFICANT CHANGE UP (ref 80–100)
MONOCYTES # BLD AUTO: 0.79 K/UL — SIGNIFICANT CHANGE UP (ref 0–0.9)
MONOCYTES NFR BLD AUTO: 9.7 % — SIGNIFICANT CHANGE UP (ref 2–14)
NEUTROPHILS # BLD AUTO: 4.33 K/UL — SIGNIFICANT CHANGE UP (ref 1.8–7.4)
NEUTROPHILS NFR BLD AUTO: 53.1 % — SIGNIFICANT CHANGE UP (ref 43–77)
NRBC # FLD: 0 — SIGNIFICANT CHANGE UP
ORGANISM # SPEC MICROSCOPIC CNT: SIGNIFICANT CHANGE UP
ORGANISM # SPEC MICROSCOPIC CNT: SIGNIFICANT CHANGE UP
PHOSPHATE SERPL-MCNC: 3.8 MG/DL — SIGNIFICANT CHANGE UP (ref 2.5–4.5)
PLATELET # BLD AUTO: 369 K/UL — SIGNIFICANT CHANGE UP (ref 150–400)
PMV BLD: 10.1 FL — SIGNIFICANT CHANGE UP (ref 7–13)
POTASSIUM SERPL-MCNC: 2.9 MMOL/L — CRITICAL LOW (ref 3.5–5.3)
POTASSIUM SERPL-SCNC: 2.9 MMOL/L — CRITICAL LOW (ref 3.5–5.3)
PROT SERPL-MCNC: 7.5 G/DL — SIGNIFICANT CHANGE UP (ref 6–8.3)
PROTHROM AB SERPL-ACNC: 12.2 SEC — SIGNIFICANT CHANGE UP (ref 9.8–13.1)
RBC # BLD: 3.76 M/UL — LOW (ref 3.8–5.2)
RBC # FLD: 12 % — SIGNIFICANT CHANGE UP (ref 10.3–14.5)
SODIUM SERPL-SCNC: 137 MMOL/L — SIGNIFICANT CHANGE UP (ref 135–145)
SPECIMEN SOURCE: SIGNIFICANT CHANGE UP
SPECIMEN SOURCE: SIGNIFICANT CHANGE UP
WBC # BLD: 8.16 K/UL — SIGNIFICANT CHANGE UP (ref 3.8–10.5)
WBC # FLD AUTO: 8.16 K/UL — SIGNIFICANT CHANGE UP (ref 3.8–10.5)

## 2018-09-04 PROCEDURE — 99233 SBSQ HOSP IP/OBS HIGH 50: CPT | Mod: GC

## 2018-09-04 PROCEDURE — 74177 CT ABD & PELVIS W/CONTRAST: CPT | Mod: 26

## 2018-09-04 PROCEDURE — 99222 1ST HOSP IP/OBS MODERATE 55: CPT | Mod: GC

## 2018-09-04 RX ORDER — POTASSIUM CHLORIDE 20 MEQ
40 PACKET (EA) ORAL ONCE
Qty: 0 | Refills: 0 | Status: COMPLETED | OUTPATIENT
Start: 2018-09-04 | End: 2018-09-04

## 2018-09-04 RX ORDER — SODIUM CHLORIDE 9 MG/ML
1000 INJECTION INTRAMUSCULAR; INTRAVENOUS; SUBCUTANEOUS
Qty: 0 | Refills: 0 | Status: DISCONTINUED | OUTPATIENT
Start: 2018-09-04 | End: 2018-09-07

## 2018-09-04 RX ORDER — HYDROCHLOROTHIAZIDE 25 MG
25 TABLET ORAL DAILY
Qty: 0 | Refills: 0 | Status: DISCONTINUED | OUTPATIENT
Start: 2018-09-04 | End: 2018-09-07

## 2018-09-04 RX ORDER — POTASSIUM CHLORIDE 20 MEQ
40 PACKET (EA) ORAL EVERY 4 HOURS
Qty: 0 | Refills: 0 | Status: COMPLETED | OUTPATIENT
Start: 2018-09-04 | End: 2018-09-04

## 2018-09-04 RX ORDER — ATENOLOL 25 MG/1
50 TABLET ORAL DAILY
Qty: 0 | Refills: 0 | Status: DISCONTINUED | OUTPATIENT
Start: 2018-09-04 | End: 2018-09-07

## 2018-09-04 RX ADMIN — Medication 40 MILLIEQUIVALENT(S): at 21:16

## 2018-09-04 RX ADMIN — Medication 40 MILLIEQUIVALENT(S): at 14:52

## 2018-09-04 RX ADMIN — PIPERACILLIN AND TAZOBACTAM 25 GRAM(S): 4; .5 INJECTION, POWDER, LYOPHILIZED, FOR SOLUTION INTRAVENOUS at 14:52

## 2018-09-04 RX ADMIN — ATENOLOL 50 MILLIGRAM(S): 25 TABLET ORAL at 05:16

## 2018-09-04 RX ADMIN — Medication 25 MILLIGRAM(S): at 05:16

## 2018-09-04 RX ADMIN — Medication 25 MILLIGRAM(S): at 16:16

## 2018-09-04 RX ADMIN — Medication 40 MILLIEQUIVALENT(S): at 10:33

## 2018-09-04 RX ADMIN — ATENOLOL 50 MILLIGRAM(S): 25 TABLET ORAL at 11:45

## 2018-09-04 RX ADMIN — PIPERACILLIN AND TAZOBACTAM 25 GRAM(S): 4; .5 INJECTION, POWDER, LYOPHILIZED, FOR SOLUTION INTRAVENOUS at 05:16

## 2018-09-04 RX ADMIN — PIPERACILLIN AND TAZOBACTAM 25 GRAM(S): 4; .5 INJECTION, POWDER, LYOPHILIZED, FOR SOLUTION INTRAVENOUS at 21:16

## 2018-09-04 RX ADMIN — Medication 40 MILLIEQUIVALENT(S): at 17:53

## 2018-09-04 NOTE — PROGRESS NOTE ADULT - SUBJECTIVE AND OBJECTIVE BOX
Hossein Karduglas PGY1, Pager #[phone]    Patient is a 68y old  Female who presents with a chief complaint of called back for GNR bacteremia (03 Sep 2018 18:23)      SUBJECTIVE: No acute events overnight.    REVIEW OF SYSTEMS:  CONSTITUTIONAL: No fever, weight loss, or fatigue  NECK: No pain or stiffness  RESPIRATORY: No cough, wheezing, chills or hemoptysis; No shortness of breath  CARDIOVASCULAR: No chest pain, palpitations, dizziness, or leg swelling  GASTROINTESTINAL: No abdominal or epigastric pain  GENITOURINARY: No dysuria  NEUROLOGICAL: No headaches or tremors  SKIN: No itching        MEDICATIONS  (STANDING):  ATENolol  Tablet 50 milliGRAM(s) Oral daily  enoxaparin Injectable 40 milliGRAM(s) SubCutaneous daily  hydrochlorothiazide 25 milliGRAM(s) Oral daily  influenza   Vaccine 0.5 milliLiter(s) IntraMuscular once  piperacillin/tazobactam IVPB. 3.375 Gram(s) IV Intermittent every 8 hours  sodium chloride 0.9%. 1000 milliLiter(s) (50 mL/Hr) IV Continuous <Continuous>    MEDICATIONS  (PRN):        Objective:    Vitals: Vital Signs Last 24 Hrs  T(C): 37.2 (09-04-18 @ 04:55), Max: 37.8 (09-03-18 @ 21:32)  T(F): 99 (09-04-18 @ 04:55), Max: 100 (09-03-18 @ 21:32)  HR: 82 (09-04-18 @ 04:55) (65 - 82)  BP: 134/77 (09-04-18 @ 04:55) (131/79 - 144/76)  BP(mean): --  RR: 17 (09-04-18 @ 04:55) (17 - 20)  SpO2: 97% (09-04-18 @ 04:55) (96% - 100%)            I&O's Summary    03 Sep 2018 07:01  -  04 Sep 2018 06:48  --------------------------------------------------------  IN: 0 mL / OUT: 500 mL / NET: -500 mL        PHYSICAL EXAM:  GENERAL: NAD  HEAD:  Atraumatic, Normocephalic  CHEST/LUNG: Clear to auscultation bilaterally; No rales, rhonchi, or wheezing  HEART: Regular rate and rhythm; No murmurs, rubs, or gallops  ABDOMEN: Soft, nontender, nondistended  EXTREMITIES:  No edema  SKIN: No rashes or lesions  NERVOUS SYSTEM:  Alert & Oriented X3    LABS:  09-03    140  |  96<L>  |  27<H>  ----------------------------<  142<H>  3.5   |  28  |  1.11    Ca    9.8      03 Sep 2018 16:40    TPro  8.4<H>  /  Alb  4.3  /  TBili  0.4  /  DBili  x   /  AST  19  /  ALT  19  /  AlkPhos  134<H>  09-03                                          13.6   8.39  )-----------( 424      ( 03 Sep 2018 16:10 )             39.4     CAPILLARY BLOOD GLUCOSE        RADIOLOGY:  Imaging Personally Reviewed: yes      Consultants involved in case: yes  Consultant(s) Notes Reviewed:  yes  Care Discussed with Consultants/Other Providers       Hossein Vargas, PGY-1 Hossein Hernánduglas PGY1, Pager #[phone]    Patient is a 68y old  Female who presents with a chief complaint of called back for GNR bacteremia (03 Sep 2018 18:23)      SUBJECTIVE: No acute events overnight. Patient with no complaints this AM. No diarrhea, nausea, or vomiting.     REVIEW OF SYSTEMS:  CONSTITUTIONAL: No fever, weight loss, or fatigue  NECK: No pain or stiffness  RESPIRATORY: No cough, wheezing, chills or hemoptysis; No shortness of breath  CARDIOVASCULAR: No chest pain, palpitations, dizziness, or leg swelling  GASTROINTESTINAL: No abdominal or epigastric pain  GENITOURINARY: No dysuria  NEUROLOGICAL: No headaches or tremors  SKIN: No itching        MEDICATIONS  (STANDING):  ATENolol  Tablet 50 milliGRAM(s) Oral daily  enoxaparin Injectable 40 milliGRAM(s) SubCutaneous daily  hydrochlorothiazide 25 milliGRAM(s) Oral daily  influenza   Vaccine 0.5 milliLiter(s) IntraMuscular once  piperacillin/tazobactam IVPB. 3.375 Gram(s) IV Intermittent every 8 hours  sodium chloride 0.9%. 1000 milliLiter(s) (50 mL/Hr) IV Continuous <Continuous>    MEDICATIONS  (PRN):        Objective:    Vitals: Vital Signs Last 24 Hrs  T(C): 37.2 (09-04-18 @ 04:55), Max: 37.8 (09-03-18 @ 21:32)  T(F): 99 (09-04-18 @ 04:55), Max: 100 (09-03-18 @ 21:32)  HR: 82 (09-04-18 @ 04:55) (65 - 82)  BP: 134/77 (09-04-18 @ 04:55) (131/79 - 144/76)  BP(mean): --  RR: 17 (09-04-18 @ 04:55) (17 - 20)  SpO2: 97% (09-04-18 @ 04:55) (96% - 100%)            I&O's Summary    03 Sep 2018 07:01  -  04 Sep 2018 06:48  --------------------------------------------------------  IN: 0 mL / OUT: 500 mL / NET: -500 mL        PHYSICAL EXAM:  GENERAL: NAD  HEAD:  Atraumatic, Normocephalic  CHEST/LUNG: Clear to auscultation bilaterally; No rales, rhonchi, or wheezing  HEART: Regular rate and rhythm; No murmurs, rubs, or gallops  ABDOMEN: Soft, nontender, nondistended  EXTREMITIES:  No edema  SKIN: No rashes or lesions  NERVOUS SYSTEM:  Alert & Oriented X3    LABS:  09-03    140  |  96<L>  |  27<H>  ----------------------------<  142<H>  3.5   |  28  |  1.11    Ca    9.8      03 Sep 2018 16:40    TPro  8.4<H>  /  Alb  4.3  /  TBili  0.4  /  DBili  x   /  AST  19  /  ALT  19  /  AlkPhos  134<H>  09-03                                          13.6   8.39  )-----------( 424      ( 03 Sep 2018 16:10 )             39.4     CAPILLARY BLOOD GLUCOSE        RADIOLOGY:  Imaging Personally Reviewed: yes      Consultants involved in case: yes  Consultant(s) Notes Reviewed:  yes  Care Discussed with Consultants/Other Providers       Hossein Vargas, PGY-1

## 2018-09-04 NOTE — PROGRESS NOTE ADULT - ATTENDING COMMENTS
68F p/w flank pain and several weeks of fevers 8/31 in ED, called in for positive blood culture  --f/u ID of bacteria in blood, continue zosyn for now  --MRCP to w/u dialted bile duct  --consider ID consult

## 2018-09-04 NOTE — CONSULT NOTE ADULT - SUBJECTIVE AND OBJECTIVE BOX
HPI: 69 y/o F PMH HTN, gout called back by ED for bacteremia. Pt states she has been having fevers up to 102F for past three weeks. She saw a PCP and was dx w gout flare in Left great toe which was erythematous, TTP. She was given steroids and toe improved; still w some ttp but no erythema. However her fevers did not improve. Pt states she did not receive abx. States as fevers persisted she came into ED 8/31 for eval. In ED, BCx were drawn and she was d/c wo abx. Pt called back when BCx grew GNR and gram variable rods at 54 hrs in 1/4 bottles. Denies dysuria, back pain, abd pain, n/v.    Denies cough, rhinorrhea, sore throat, cp, sob. 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 Denies travel, sick contacts. Has had mosquito bites but denies tick/animal bites. No hiking.       In the ED:  Vital Signs Last 24 Hrs  T(C): 36.9 (09-03-18 @ 17:41), Max: 36.9 (09-03-18 @ 17:41)  T(F): 98.5 (09-03-18 @ 17:41), Max: 98.5 (09-03-18 @ 17:41)  HR: 65 (09-03-18 @ 17:41) (65 - 72)  BP: 143/54 (09-03-18 @ 17:41) (141/73 - 143/54)  BP(mean): --  RR: 19 (09-03-18 @ 17:41) (19 - 20)  SpO2: 100% (09-03-18 @ 17:41) (99% - 100%) (03 Sep 2018 18:23)      PAST MEDICAL & SURGICAL HISTORY:  HTN (hypertension)  No significant past surgical history      Allergies    No Known Allergies    Intolerances        ANTIMICROBIALS:  piperacillin/tazobactam IVPB. 3.375 every 8 hours      OTHER MEDS:  ATENolol  Tablet 50 milliGRAM(s) Oral daily  enoxaparin Injectable 40 milliGRAM(s) SubCutaneous daily  hydrochlorothiazide 25 milliGRAM(s) Oral daily  influenza   Vaccine 0.5 milliLiter(s) IntraMuscular once  potassium chloride    Tablet ER 40 milliEquivalent(s) Oral every 4 hours  sodium chloride 0.9%. 1000 milliLiter(s) IV Continuous <Continuous>      SOCIAL HISTORY:    Substance Use (street drugs): denies  Tobacco Usage:  denies  Alcohol Usage: denies    FAMILY HISTORY:  No pertinent family history in first degree relatives      ROS:  All other systems negative     Constitutional: fever,  chills, no weight loss, no night sweats  Eye: no eye pain, no redness, no vision changes  ENT:  no sore throat, no rhinorrhea  Cardiovascular:  no chest pain, no palpitation  Respiratory:  no SOB, no cough  GI:  no abd pain, no vomiting, no diarrhea  urinary: no dysuria, no hematuria, no flank pain  : no  discharge or bleeding  musculoskeletal:  no joint pain, no joint swelling  skin:  no rash  neurology:  no headache, no seizure, no change in mental status  psych: no anxiety, no depression     Physical Exam:    General:    NAD, non toxic  Head: atraumatic, normocephalic  Eyes: normal sclera and conjunctiva  ENT:   no oropharyngeal lesions, no LAD, neck supple  Cardio:    regular S1,S2  Respiratory:   clear b/l, no wheezing  abd:   soft, BS +, not tender, no hepatosplenomegaly  :     no CVAT, no suprapubic tenderness, no friedman  Musculoskeletal : L great toe ttp and warm to touch but no erythema  Skin:    no rash  vascular: no lines, normal pulses  Neurologic:     no focal deficits  psych: normal affect, no suicidal ideation      Drug Dosing Weight  Height (cm): 157.48 (03 Sep 2018 19:24)  Weight (kg): 68.4 (03 Sep 2018 19:24)  BMI (kg/m2): 27.6 (03 Sep 2018 19:24)  BSA (m2): 1.7 (03 Sep 2018 19:24)    Vital Signs Last 24 Hrs  T(F): 98.9 (09-04-18 @ 15:51), Max: 100.6 (08-31-18 @ 20:26)    Vital Signs Last 24 Hrs  HR: 74 (09-04-18 @ 15:51) (70 - 82)  BP: 137/88 (09-04-18 @ 15:51) (114/69 - 144/76)  RR: 18 (09-04-18 @ 15:51)  SpO2: 97% (09-04-18 @ 15:51) (96% - 99%)  Wt(kg): --                          12.5   8.16  )-----------( 369      ( 04 Sep 2018 07:00 )             35.4       09-04    137  |  95<L>  |  21  ----------------------------<  147<H>  2.9<LL>   |  27  |  0.98    Ca    9.1      04 Sep 2018 07:00  Phos  3.8     09-04  Mg     2.3     09-04    TPro  7.5  /  Alb  3.7  /  TBili  0.8  /  DBili  x   /  AST  17  /  ALT  16  /  AlkPhos  104  09-04          MICROBIOLOGY:  v  BLOOD PERIPHERAL  09-03-18 --  --  --      URINE MIDSTREAM  09-01-18 --  --  --      BLOOD PERIPHERAL  08-31-18 --  --  BLOOD CULTURE PCR  GRAM VARIABLE RODS      RADIOLOGY:  CXR- Clear lungs.(8/31)  CT a/p-Dilated CBD and mild intrahepatic biliary dilatation; suggest MRI and/or   GI consultation.Porcelain gallbladder.

## 2018-09-04 NOTE — PROGRESS NOTE ADULT - PROBLEM SELECTOR PLAN 1
Pt called back b/c of GNR bacteremia after being discharged from the ED; pt is afebrile, w/o leukocytosis or overt physical exam findings suggestive of source  - f/u UA  - f/u BCx x2  - can consider CT A/P w/ IV contrast to look for source  - c/w Zosyn (day 2) until repeat cultures are cleared Pt called back b/c of GNR bacteremia after being discharged from the ED; pt is afebrile, w/o leukocytosis or overt physical exam findings suggestive of source  - f/u UA  - f/u BCx x2  - CT A/P w/ IV contrast to look for GI source  - c/w Zosyn (day 2) until repeat cultures are cleared  -TTE ordered to r/o endocarditis

## 2018-09-04 NOTE — CONSULT NOTE ADULT - ASSESSMENT
69 y/o F PMH HTN, gout p/w fever 2/2 GNR and gram variable lucrecia bacteremia, concern for abd source.    - UA from 8/31 negative  - CT a/p- dilated CBD, porcelain GB  - agree w MRCP  - follow up repeat BCx  - c/w zosyn at current dose

## 2018-09-05 ENCOUNTER — TRANSCRIPTION ENCOUNTER (OUTPATIENT)
Age: 68
End: 2018-09-05

## 2018-09-05 DIAGNOSIS — K82.8 OTHER SPECIFIED DISEASES OF GALLBLADDER: ICD-10-CM

## 2018-09-05 LAB
APPEARANCE UR: CLEAR — SIGNIFICANT CHANGE UP
BACTERIA # UR AUTO: NEGATIVE — SIGNIFICANT CHANGE UP
BACTERIA BLD CULT: SIGNIFICANT CHANGE UP
BASOPHILS # BLD AUTO: 0.04 K/UL — SIGNIFICANT CHANGE UP (ref 0–0.2)
BASOPHILS NFR BLD AUTO: 0.4 % — SIGNIFICANT CHANGE UP (ref 0–2)
BILIRUB UR-MCNC: NEGATIVE — SIGNIFICANT CHANGE UP
BLOOD UR QL VISUAL: NEGATIVE — SIGNIFICANT CHANGE UP
BUN SERPL-MCNC: 18 MG/DL — SIGNIFICANT CHANGE UP (ref 7–23)
CALCIUM SERPL-MCNC: 9.5 MG/DL — SIGNIFICANT CHANGE UP (ref 8.4–10.5)
CHLORIDE SERPL-SCNC: 98 MMOL/L — SIGNIFICANT CHANGE UP (ref 98–107)
CO2 SERPL-SCNC: 25 MMOL/L — SIGNIFICANT CHANGE UP (ref 22–31)
COLOR SPEC: YELLOW — SIGNIFICANT CHANGE UP
CREAT SERPL-MCNC: 0.93 MG/DL — SIGNIFICANT CHANGE UP (ref 0.5–1.3)
EOSINOPHIL # BLD AUTO: 0.06 K/UL — SIGNIFICANT CHANGE UP (ref 0–0.5)
EOSINOPHIL NFR BLD AUTO: 0.6 % — SIGNIFICANT CHANGE UP (ref 0–6)
GLUCOSE SERPL-MCNC: 179 MG/DL — HIGH (ref 70–99)
GLUCOSE UR-MCNC: 200 — SIGNIFICANT CHANGE UP
HCT VFR BLD CALC: 35.5 % — SIGNIFICANT CHANGE UP (ref 34.5–45)
HGB BLD-MCNC: 12.6 G/DL — SIGNIFICANT CHANGE UP (ref 11.5–15.5)
HYALINE CASTS # UR AUTO: NEGATIVE — SIGNIFICANT CHANGE UP
IMM GRANULOCYTES # BLD AUTO: 0.05 # — SIGNIFICANT CHANGE UP
IMM GRANULOCYTES NFR BLD AUTO: 0.5 % — SIGNIFICANT CHANGE UP (ref 0–1.5)
KETONES UR-MCNC: NEGATIVE — SIGNIFICANT CHANGE UP
LEUKOCYTE ESTERASE UR-ACNC: NEGATIVE — SIGNIFICANT CHANGE UP
LYMPHOCYTES # BLD AUTO: 3.2 K/UL — SIGNIFICANT CHANGE UP (ref 1–3.3)
LYMPHOCYTES # BLD AUTO: 30.8 % — SIGNIFICANT CHANGE UP (ref 13–44)
MAGNESIUM SERPL-MCNC: 2.3 MG/DL — SIGNIFICANT CHANGE UP (ref 1.6–2.6)
MCHC RBC-ENTMCNC: 34.1 PG — HIGH (ref 27–34)
MCHC RBC-ENTMCNC: 35.5 % — SIGNIFICANT CHANGE UP (ref 32–36)
MCV RBC AUTO: 96.2 FL — SIGNIFICANT CHANGE UP (ref 80–100)
MONOCYTES # BLD AUTO: 0.94 K/UL — HIGH (ref 0–0.9)
MONOCYTES NFR BLD AUTO: 9.1 % — SIGNIFICANT CHANGE UP (ref 2–14)
NEUTROPHILS # BLD AUTO: 6.09 K/UL — SIGNIFICANT CHANGE UP (ref 1.8–7.4)
NEUTROPHILS NFR BLD AUTO: 58.6 % — SIGNIFICANT CHANGE UP (ref 43–77)
NITRITE UR-MCNC: NEGATIVE — SIGNIFICANT CHANGE UP
NRBC # FLD: 0 — SIGNIFICANT CHANGE UP
ORGANISM # SPEC MICROSCOPIC CNT: SIGNIFICANT CHANGE UP
PH UR: 6.5 — SIGNIFICANT CHANGE UP (ref 5–8)
PHOSPHATE SERPL-MCNC: 3.1 MG/DL — SIGNIFICANT CHANGE UP (ref 2.5–4.5)
PLATELET # BLD AUTO: 353 K/UL — SIGNIFICANT CHANGE UP (ref 150–400)
PMV BLD: 9.4 FL — SIGNIFICANT CHANGE UP (ref 7–13)
POTASSIUM SERPL-MCNC: 4.6 MMOL/L — SIGNIFICANT CHANGE UP (ref 3.5–5.3)
POTASSIUM SERPL-SCNC: 4.6 MMOL/L — SIGNIFICANT CHANGE UP (ref 3.5–5.3)
PROT UR-MCNC: SIGNIFICANT CHANGE UP
RBC # BLD: 3.69 M/UL — LOW (ref 3.8–5.2)
RBC # FLD: 12 % — SIGNIFICANT CHANGE UP (ref 10.3–14.5)
RBC CASTS # UR COMP ASSIST: SIGNIFICANT CHANGE UP (ref 0–?)
SODIUM SERPL-SCNC: 136 MMOL/L — SIGNIFICANT CHANGE UP (ref 135–145)
SP GR SPEC: 1.03 — SIGNIFICANT CHANGE UP (ref 1–1.04)
SQUAMOUS # UR AUTO: SIGNIFICANT CHANGE UP
UROBILINOGEN FLD QL: NORMAL — SIGNIFICANT CHANGE UP
WBC # BLD: 10.38 K/UL — SIGNIFICANT CHANGE UP (ref 3.8–10.5)
WBC # FLD AUTO: 10.38 K/UL — SIGNIFICANT CHANGE UP (ref 3.8–10.5)
WBC UR QL: SIGNIFICANT CHANGE UP (ref 0–?)

## 2018-09-05 PROCEDURE — 74181 MRI ABDOMEN W/O CONTRAST: CPT | Mod: 26

## 2018-09-05 PROCEDURE — 99222 1ST HOSP IP/OBS MODERATE 55: CPT | Mod: GC

## 2018-09-05 PROCEDURE — 99233 SBSQ HOSP IP/OBS HIGH 50: CPT | Mod: GC

## 2018-09-05 RX ORDER — ACETAMINOPHEN 500 MG
650 TABLET ORAL EVERY 6 HOURS
Qty: 0 | Refills: 0 | Status: DISCONTINUED | OUTPATIENT
Start: 2018-09-05 | End: 2018-09-07

## 2018-09-05 RX ADMIN — ATENOLOL 50 MILLIGRAM(S): 25 TABLET ORAL at 05:06

## 2018-09-05 RX ADMIN — PIPERACILLIN AND TAZOBACTAM 25 GRAM(S): 4; .5 INJECTION, POWDER, LYOPHILIZED, FOR SOLUTION INTRAVENOUS at 14:45

## 2018-09-05 RX ADMIN — Medication 25 MILLIGRAM(S): at 05:06

## 2018-09-05 RX ADMIN — Medication 650 MILLIGRAM(S): at 23:38

## 2018-09-05 RX ADMIN — PIPERACILLIN AND TAZOBACTAM 25 GRAM(S): 4; .5 INJECTION, POWDER, LYOPHILIZED, FOR SOLUTION INTRAVENOUS at 22:15

## 2018-09-05 RX ADMIN — SODIUM CHLORIDE 50 MILLILITER(S): 9 INJECTION INTRAMUSCULAR; INTRAVENOUS; SUBCUTANEOUS at 22:15

## 2018-09-05 RX ADMIN — PIPERACILLIN AND TAZOBACTAM 25 GRAM(S): 4; .5 INJECTION, POWDER, LYOPHILIZED, FOR SOLUTION INTRAVENOUS at 05:06

## 2018-09-05 NOTE — DISCHARGE NOTE ADULT - HOSPITAL COURSE
69 yo F w/ PMH HTN initially presented for several weeks of persistent fevers and called back to hospital for admission after being found to have GVR on BCx. Patient was empirically started on zosyn. CT A/P was performed for source identification, and showed a dilated CBD. MRCP showed cholelithiasis and porcelain gallbladder, but no choledocholithiasis. Gen Surg was consulted for porcelain gallbladder and patient will f/u for elective lap lgoan. Repeat BCx were clear. 69 yo F w/ PMH HTN initially presented for several weeks of persistent fevers and called back to hospital for admission after being found to have GVR on BCx. Patient was empirically started on zosyn. CT A/P was performed for source identification, and showed a dilated CBD and porcelain gallbladder. MRCP showed cholelithiasis, but no choledocholithiasis. Gen Surg was consulted for porcelain gallbladder and patient will f/u for elective lap logan. Repeat BCx were clear. Patient will be discharged on 5 day course of Augmentin. Patient stable and ready for discharge.

## 2018-09-05 NOTE — PROGRESS NOTE ADULT - PROBLEM SELECTOR PLAN 1
Pt called back b/c of GNR bacteremia after being discharged from the ED; pt is afebrile, w/o leukocytosis or overt physical exam findings suggestive of source  - f/u UA  - f/u BCx x2  - CT A/P showed dilated CBD and porcelain gallbladder  - f/u GI recs  - c/w Zosyn (day 3) until repeat cultures are cleared  - TTE ordered to r/o endocarditis

## 2018-09-05 NOTE — CONSULT NOTE ADULT - SUBJECTIVE AND OBJECTIVE BOX
Chief Complaint:  Patient is a 68y old  Female who presents with a chief complaint of called back for GNR bacteremia (05 Sep 2018 06:56)      HPI:  67 y/o F PMH HTN called back to ED today for BCx at 54 hours + for GNR in 1 bottle.  Pt was evaluated 2 days ago in the ED for fever of unclear etiology w/ TMax 102 with 19 days of fever prior to coming into the hospital; did not have fever today but had Tmax 100 yesterday. Pt states her symptoms have gradually improved over past 2 days. Describes occasional non-productive cough which has now resolved, along with urinary symptoms consisting of L flank pain x 3 weeks and polyuria, which have now completely resolved as well. Patient denies any recent travel or sick contacts.  No recent dental work. No abdominal pain, diarrhea, dysuria, SOB, chest pain, or naseau/vomiting.    In the ED:  Vital Signs Last 24 Hrs  T(C): 36.9 (09-03-18 @ 17:41), Max: 36.9 (09-03-18 @ 17:41)  T(F): 98.5 (09-03-18 @ 17:41), Max: 98.5 (09-03-18 @ 17:41)  HR: 65 (09-03-18 @ 17:41) (65 - 72)  BP: 143/54 (09-03-18 @ 17:41) (141/73 - 143/54)  BP(mean): --  RR: 19 (09-03-18 @ 17:41) (19 - 20)  SpO2: 100% (09-03-18 @ 17:41) (99% - 100%)    Allergies:  No Known Allergies      Home Medications:   Outpatient Medication Status not yet specified    Hospital Medications:  ATENolol  Tablet 50 milliGRAM(s) Oral daily  enoxaparin Injectable 40 milliGRAM(s) SubCutaneous daily  hydrochlorothiazide 25 milliGRAM(s) Oral daily  influenza   Vaccine 0.5 milliLiter(s) IntraMuscular once  piperacillin/tazobactam IVPB. 3.375 Gram(s) IV Intermittent every 8 hours  sodium chloride 0.9%. 1000 milliLiter(s) IV Continuous <Continuous>      PMHX/PSHX:  HTN (hypertension)  No pertinent past medical history  No significant past surgical history      Family history:  No pertinent family history in first degree relatives      Social History:     ROS:     General:  No wt loss, fevers, chills, night sweats, fatigue,   Eyes:  Good vision, no reported pain  ENT:  No sore throat, pain, runny nose, dysphagia  CV:  No pain, palpitations, hypo/hypertension  Resp:  No dyspnea, cough, tachypnea, wheezing  GI:  See HPI  :  No pain, bleeding, incontinence, nocturia  Muscle:  No pain, weakness  Neuro:  No weakness, tingling, memory problems  Psych:  No fatigue, insomnia, mood problems, depression  Endocrine:  No polyuria, polydipsia, cold/heat intolerance  Heme:  No petechiae, ecchymosis, easy bruisability  Skin:  No rash, edema      PHYSICAL EXAM:     GENERAL:  Appears stated age, well-groomed, well-nourished, no distress  HEENT:  NC/AT,  conjunctivae clear and pink,  no JVD  CHEST:  Full & symmetric excursion, no increased effort, breath sounds clear  HEART:  Regular rhythm, S1, S2, no murmur/rub/S3/S4, no abdominal bruit, no edema  ABDOMEN:  Soft, non-tender, non-distended, normoactive bowel sounds,  no masses ,  EXTREMITIES:  no cyanosis,clubbing or edema  SKIN:  No rash/erythema/ecchymoses/petechiae/wounds/abscess/warm/dry  NEURO:  Alert, oriented    Vital Signs:  Vital Signs Last 24 Hrs  T(C): 37.2 (05 Sep 2018 04:55), Max: 37.5 (04 Sep 2018 21:15)  T(F): 98.9 (05 Sep 2018 04:55), Max: 99.5 (04 Sep 2018 21:15)  HR: 81 (05 Sep 2018 04:55) (72 - 81)  BP: 109/78 (05 Sep 2018 04:55) (109/78 - 137/88)  BP(mean): --  RR: 17 (05 Sep 2018 04:55) (17 - 18)  SpO2: 95% (05 Sep 2018 04:55) (95% - 99%)  Daily     Daily     LABS:                        12.5   8.16  )-----------( 369      ( 04 Sep 2018 07:00 )             35.4     09-04    137  |  95<L>  |  21  ----------------------------<  147<H>  2.9<LL>   |  27  |  0.98    Ca    9.1      04 Sep 2018 07:00  Phos  3.8     09-04  Mg     2.3     09-04    TPro  7.5  /  Alb  3.7  /  TBili  0.8  /  DBili  x   /  AST  17  /  ALT  16  /  AlkPhos  104  09-04    LIVER FUNCTIONS - ( 04 Sep 2018 07:00 )  Alb: 3.7 g/dL / Pro: 7.5 g/dL / ALK PHOS: 104 u/L / ALT: 16 u/L / AST: 17 u/L / GGT: x           PT/INR - ( 04 Sep 2018 07:00 )   PT: 12.2 SEC;   INR: 1.10          PTT - ( 04 Sep 2018 07:00 )  PTT:29.4 SEC        Imaging:    < from: CT Abdomen and Pelvis w/ IV Cont (09.04.18 @ 12:25) >  FINDINGS:    LOWER CHEST: Within normal limits.     LIVER: Within normal limits.   BILE DUCTS: Dilated common bile duct measuring up to 15 mm. Mild   intrahepatic biliary dilatation. No discrete mass or stricture.  GALLBLADDER: Porcelain gallbladder.  SPLEEN: Within normal limits.   PANCREAS: Within normal limits.  ADRENALS: Within normal limits.   KIDNEYS/URETERS: Within normallimits.     BLADDER: Within normal limits.   REPRODUCTIVE ORGANS: Fibroid uterus.    BOWEL: No bowel obstruction. The appendix is normal.  Small hiatal hernia   versus potion diverticulum.      PERITONEUM: No ascites.   VESSELS:  Within normal limits.  RETROPERITONEUM: No lymphadenopathy.    ABDOMINAL WALL: Small umbilical hernia. Small fat-containing ventral   hernia in the upper abdomen.  BONES: Within normal limits.    IMPRESSION:   Dilated CBD and mild intrahepatic biliary dilatation; suggestMRI and/or   GI consultation.    Porcelain gallbladder.        < end of copied text > Chief Complaint:  Patient is a 68y old  Female who presents with a chief complaint of called back for GNR bacteremia (05 Sep 2018 06:56)      HPI:  68 year old female with HTN and arthritis presented to the ER with chief complain of fever for 3 weeks.     As per the patient, she has had fever for 3 weeks, on and off, upto 103 degrees. She went to the ER and was discharged since the workup was negative. She later received a call that her blood cultures at 54 hours were positive for gram variable rods in 1 bottle and thus she was asked to come back to the hosp for further workup. She denies nausea, vomiting, abdominal pin, changes in BM, blood in stool, melena, cough. She has never had an endoscopy or a colonoscopy.     She had a CT scan done which showed dilated CBD with procelain gallbladder and thus GI was consulted for further workup.     In the ED:  Vital Signs Last 24 Hrs  T(C): 36.9 (09-03-18 @ 17:41), Max: 36.9 (09-03-18 @ 17:41)  T(F): 98.5 (09-03-18 @ 17:41), Max: 98.5 (09-03-18 @ 17:41)  HR: 65 (09-03-18 @ 17:41) (65 - 72)  BP: 143/54 (09-03-18 @ 17:41) (141/73 - 143/54)  BP(mean): --  RR: 19 (09-03-18 @ 17:41) (19 - 20)  SpO2: 100% (09-03-18 @ 17:41) (99% - 100%)    Allergies:  No Known Allergies      Home Medications:   Outpatient Medication Status not yet specified    Hospital Medications:  ATENolol  Tablet 50 milliGRAM(s) Oral daily  enoxaparin Injectable 40 milliGRAM(s) SubCutaneous daily  hydrochlorothiazide 25 milliGRAM(s) Oral daily  influenza   Vaccine 0.5 milliLiter(s) IntraMuscular once  piperacillin/tazobactam IVPB. 3.375 Gram(s) IV Intermittent every 8 hours  sodium chloride 0.9%. 1000 milliLiter(s) IV Continuous <Continuous>      PMHX/PSHX:  HTN (hypertension)  No pertinent past medical history  No significant past surgical history      Family history:  No pertinent family history in first degree relatives      Social History:     ROS:     General:  No wt loss, fevers, chills, night sweats, fatigue,   Eyes:  Good vision, no reported pain  ENT:  No sore throat, pain, runny nose, dysphagia  CV:  No pain, palpitations, hypo/hypertension  Resp:  No dyspnea, cough, tachypnea, wheezing  GI:  See HPI  :  No pain, bleeding, incontinence, nocturia  Muscle:  No pain, weakness  Neuro:  No weakness, tingling, memory problems  Psych:  No fatigue, insomnia, mood problems, depression  Endocrine:  No polyuria, polydipsia, cold/heat intolerance  Heme:  No petechiae, ecchymosis, easy bruisability  Skin:  No rash, edema      PHYSICAL EXAM:     GENERAL:  Appears stated age, well-groomed, well-nourished, no distress  HEENT:  NC/AT,  conjunctivae clear and pink,  no JVD  CHEST:  Full & symmetric excursion, no increased effort, breath sounds clear  HEART:  Regular rhythm, S1, S2, no murmur/rub/S3/S4, no abdominal bruit, no edema  ABDOMEN:  Soft, non-tender, non-distended, normoactive bowel sounds,  no masses ,  EXTREMITIES:  no cyanosis,clubbing or edema  SKIN:  No rash/erythema/ecchymoses/petechiae/wounds/abscess/warm/dry  NEURO:  Alert, oriented    Vital Signs:  Vital Signs Last 24 Hrs  T(C): 37.2 (05 Sep 2018 04:55), Max: 37.5 (04 Sep 2018 21:15)  T(F): 98.9 (05 Sep 2018 04:55), Max: 99.5 (04 Sep 2018 21:15)  HR: 81 (05 Sep 2018 04:55) (72 - 81)  BP: 109/78 (05 Sep 2018 04:55) (109/78 - 137/88)  BP(mean): --  RR: 17 (05 Sep 2018 04:55) (17 - 18)  SpO2: 95% (05 Sep 2018 04:55) (95% - 99%)  Daily     Daily     LABS:                        12.5   8.16  )-----------( 369      ( 04 Sep 2018 07:00 )             35.4     09-04    137  |  95<L>  |  21  ----------------------------<  147<H>  2.9<LL>   |  27  |  0.98    Ca    9.1      04 Sep 2018 07:00  Phos  3.8     09-04  Mg     2.3     09-04    TPro  7.5  /  Alb  3.7  /  TBili  0.8  /  DBili  x   /  AST  17  /  ALT  16  /  AlkPhos  104  09-04    LIVER FUNCTIONS - ( 04 Sep 2018 07:00 )  Alb: 3.7 g/dL / Pro: 7.5 g/dL / ALK PHOS: 104 u/L / ALT: 16 u/L / AST: 17 u/L / GGT: x           PT/INR - ( 04 Sep 2018 07:00 )   PT: 12.2 SEC;   INR: 1.10          PTT - ( 04 Sep 2018 07:00 )  PTT:29.4 SEC        Imaging:  < from: CT Abdomen and Pelvis w/ IV Cont (09.04.18 @ 12:25) >  FINDINGS:    LOWER CHEST: Within normal limits.     LIVER: Within normal limits.   BILE DUCTS: Dilated common bile duct measuring up to 15 mm. Mild intrahepatic biliary dilatation. No discrete mass or stricture.  GALLBLADDER: Porcelain gallbladder.  SPLEEN: Within normal limits.   PANCREAS: Within normal limits.  ADRENALS: Within normal limits.   KIDNEYS/URETERS: Within normallimits.     BLADDER: Within normal limits.   REPRODUCTIVE ORGANS: Fibroid uterus.    BOWEL: No bowel obstruction. The appendix is normal.  Small hiatal hernia versus potion diverticulum.      PERITONEUM: No ascites.   VESSELS:  Within normal limits.  RETROPERITONEUM: No lymphadenopathy.    ABDOMINAL WALL: Small umbilical hernia. Small fat-containing ventral   hernia in the upper abdomen.  BONES: Within normal limits.    IMPRESSION:   Dilated CBD and mild intrahepatic biliary dilatation; suggestMRI and/or GI consultation. Porcelain gallbladder.  < end of copied text >

## 2018-09-05 NOTE — DISCHARGE NOTE ADULT - CARE PROVIDER_API CALL
Trev Wayne (MD), Surgery  [address] [place] [address]  Phone: [phone]  Fax: [phone]    Faustina Coronado  Phone: [phone]  Fax: (       -

## 2018-09-05 NOTE — CHART NOTE - NSCHARTNOTEFT_GEN_A_CORE
FOLLOW UP    - Reviewed imaging  MR MRCP No Cont: IMPRESSION: 1.4 cm stone in the gallbladder neck. Porcelain gallbladder. Borderline dilated intra and extrahepatic biliary system of indeterminate significance. No choledocholithiasis. No evidence for neoplasm.    - Likely had a CBD stone which has now passed given normal liver enzymes.  - Plan endoscopic intervention plan from GI  - Recommend cholecystectomy     GI team will signoff.   Please call us back with questions.     Crystal Mcpherson, PGY-5  GI fellow  B- [id number]/ [phone]  Please call GI fellow on call after 5pm and on weekends

## 2018-09-05 NOTE — PROGRESS NOTE ADULT - ATTENDING COMMENTS
68F p/w flank pain and several weeks of fevers 8/31 in ED, called in for positive blood culture  --f/u ID of bacteria in blood, continue zosyn for now.  appreciate ID recs  --MRCP to w/u dialted bile duct  --surgery consult for porcelain GB

## 2018-09-05 NOTE — PROGRESS NOTE ADULT - SUBJECTIVE AND OBJECTIVE BOX
Hossein Hernánduglas PGY1, Pager #[phone]      Patient is a 68y old  Female who presents with a chief complaint of called back for GNR bacteremia (04 Sep 2018 18:54)      SUBJECTIVE: No acute events overnight.    REVIEW OF SYSTEMS:  CONSTITUTIONAL: No fever, weight loss, or fatigue  RESPIRATORY: No cough, wheezing, chills or hemoptysis; No shortness of breath  CARDIOVASCULAR: No chest pain, palpitations, dizziness, or leg swelling  GASTROINTESTINAL: No abdominal or epigastric pain. No nausea, vomiting, or hematemesis; No diarrhea or constipation. No melena or hematochezia.  GENITOURINARY: No dysuria, frequency, hematuria, or incontinence  NEUROLOGICAL: No headaches, memory loss, loss of strength, numbness, or tremors  SKIN: No itching, burning, rashes, or lesions       MEDICATIONS  (STANDING):  ATENolol  Tablet 50 milliGRAM(s) Oral daily  enoxaparin Injectable 40 milliGRAM(s) SubCutaneous daily  hydrochlorothiazide 25 milliGRAM(s) Oral daily  influenza   Vaccine 0.5 milliLiter(s) IntraMuscular once  piperacillin/tazobactam IVPB. 3.375 Gram(s) IV Intermittent every 8 hours  sodium chloride 0.9%. 1000 milliLiter(s) (50 mL/Hr) IV Continuous <Continuous>    MEDICATIONS  (PRN):    Objective:    Vitals: Vital Signs Last 24 Hrs  T(C): 37.2 (09-05-18 @ 04:55), Max: 37.5 (09-04-18 @ 21:15)  T(F): 98.9 (09-05-18 @ 04:55), Max: 99.5 (09-04-18 @ 21:15)  HR: 81 (09-05-18 @ 04:55) (72 - 81)  BP: 109/78 (09-05-18 @ 04:55) (109/78 - 137/88)  BP(mean): --  RR: 17 (09-05-18 @ 04:55) (17 - 18)  SpO2: 95% (09-05-18 @ 04:55) (95% - 99%)            I&O's Summary    03 Sep 2018 07:01  -  04 Sep 2018 07:00  --------------------------------------------------------  IN: 0 mL / OUT: 500 mL / NET: -500 mL    04 Sep 2018 07:01  -  05 Sep 2018 06:56  --------------------------------------------------------  IN: 200 mL / OUT: 250 mL / NET: -50 mL        PHYSICAL EXAM:  GENERAL: NAD  HEAD:  Atraumatic, Normocephalic  CHEST/LUNG: Clear to auscultation bilaterally; No rales, rhonchi, or wheezing  HEART: Regular rate and rhythm; No murmurs, rubs, or gallops  ABDOMEN: Soft, nontender, nondistended; Bowel sounds present  EXTREMITIES:  No clubbing, cyanosis, or edema  LYMPH: No lymphadenopathy noted  SKIN: No rashes or lesions  NERVOUS SYSTEM:  Alert & Oriented X3    LABS:  09-04    137  |  95<L>  |  21  ----------------------------<  147<H>  2.9<LL>   |  27  |  0.98  09-03    140  |  96<L>  |  27<H>  ----------------------------<  142<H>  3.5   |  28  |  1.11    Ca    9.1      04 Sep 2018 07:00  Ca    9.8      03 Sep 2018 16:40  Phos  3.8     09-04  Mg     2.3     09-04    TPro  7.5  /  Alb  3.7  /  TBili  0.8  /  DBili  x   /  AST  17  /  ALT  16  /  AlkPhos  104  09-04  TPro  8.4<H>  /  Alb  4.3  /  TBili  0.4  /  DBili  x   /  AST  19  /  ALT  19  /  AlkPhos  134<H>  09-03    PT/INR - ( 04 Sep 2018 07:00 )   PT: 12.2 SEC;   INR: 1.10          PTT - ( 04 Sep 2018 07:00 )  PTT:29.4 SEC                                        12.5   8.16  )-----------( 369      ( 04 Sep 2018 07:00 )             35.4                         13.6   8.39  )-----------( 424      ( 03 Sep 2018 16:10 )             39.4     CAPILLARY BLOOD GLUCOSE        RADIOLOGY:  Imaging Personally Reviewed: yes      Consultants involved in case: yes  Consultant(s) Notes Reviewed:  yes  Care Discussed with Consultants/Other Providers       Hossein Vargas, PGY-1 Hossein Hernánduglas PGY1, Pager #[phone]      Patient is a 68y old  Female who presents with a chief complaint of called back for GNR bacteremia (04 Sep 2018 18:54)      SUBJECTIVE: No acute events overnight. Patient seen at bedside this AM. Denies any abdominal pain, change in bowel habits, fever, chills.    REVIEW OF SYSTEMS:  CONSTITUTIONAL: No fever, weight loss, or fatigue  RESPIRATORY: No cough, wheezing, chills or hemoptysis; No shortness of breath  CARDIOVASCULAR: No chest pain, palpitations, dizziness, or leg swelling  GASTROINTESTINAL: No abdominal or epigastric pain. No nausea, vomiting, or hematemesis; No diarrhea or constipation. No melena or hematochezia.  GENITOURINARY: No dysuria, frequency, hematuria, or incontinence  NEUROLOGICAL: No headaches, memory loss, loss of strength, numbness, or tremors  SKIN: No itching, burning, rashes, or lesions       MEDICATIONS  (STANDING):  ATENolol  Tablet 50 milliGRAM(s) Oral daily  enoxaparin Injectable 40 milliGRAM(s) SubCutaneous daily  hydrochlorothiazide 25 milliGRAM(s) Oral daily  influenza   Vaccine 0.5 milliLiter(s) IntraMuscular once  piperacillin/tazobactam IVPB. 3.375 Gram(s) IV Intermittent every 8 hours  sodium chloride 0.9%. 1000 milliLiter(s) (50 mL/Hr) IV Continuous <Continuous>    MEDICATIONS  (PRN):    Objective:    Vitals: Vital Signs Last 24 Hrs  T(C): 37.2 (09-05-18 @ 04:55), Max: 37.5 (09-04-18 @ 21:15)  T(F): 98.9 (09-05-18 @ 04:55), Max: 99.5 (09-04-18 @ 21:15)  HR: 81 (09-05-18 @ 04:55) (72 - 81)  BP: 109/78 (09-05-18 @ 04:55) (109/78 - 137/88)  BP(mean): --  RR: 17 (09-05-18 @ 04:55) (17 - 18)  SpO2: 95% (09-05-18 @ 04:55) (95% - 99%)            I&O's Summary    03 Sep 2018 07:01  -  04 Sep 2018 07:00  --------------------------------------------------------  IN: 0 mL / OUT: 500 mL / NET: -500 mL    04 Sep 2018 07:01  -  05 Sep 2018 06:56  --------------------------------------------------------  IN: 200 mL / OUT: 250 mL / NET: -50 mL        PHYSICAL EXAM:  GENERAL: NAD  HEAD:  Atraumatic, Normocephalic  CHEST/LUNG: Clear to auscultation bilaterally; No rales, rhonchi, or wheezing  HEART: Regular rate and rhythm; No murmurs, rubs, or gallops  ABDOMEN: Soft, nontender, nondistended; Bowel sounds present  EXTREMITIES:  No clubbing, cyanosis, or edema  LYMPH: No lymphadenopathy noted  SKIN: No rashes or lesions  NERVOUS SYSTEM:  Alert & Oriented X3    LABS:  09-04    137  |  95<L>  |  21  ----------------------------<  147<H>  2.9<LL>   |  27  |  0.98  09-03    140  |  96<L>  |  27<H>  ----------------------------<  142<H>  3.5   |  28  |  1.11    Ca    9.1      04 Sep 2018 07:00  Ca    9.8      03 Sep 2018 16:40  Phos  3.8     09-04  Mg     2.3     09-04    TPro  7.5  /  Alb  3.7  /  TBili  0.8  /  DBili  x   /  AST  17  /  ALT  16  /  AlkPhos  104  09-04  TPro  8.4<H>  /  Alb  4.3  /  TBili  0.4  /  DBili  x   /  AST  19  /  ALT  19  /  AlkPhos  134<H>  09-03    PT/INR - ( 04 Sep 2018 07:00 )   PT: 12.2 SEC;   INR: 1.10          PTT - ( 04 Sep 2018 07:00 )  PTT:29.4 SEC                                        12.5   8.16  )-----------( 369      ( 04 Sep 2018 07:00 )             35.4                         13.6   8.39  )-----------( 424      ( 03 Sep 2018 16:10 )             39.4     CAPILLARY BLOOD GLUCOSE        RADIOLOGY:  Imaging Personally Reviewed: yes      Consultants involved in case: yes  Consultant(s) Notes Reviewed:  yes  Care Discussed with Consultants/Other Providers       Hossein Vargas, PGY-1

## 2018-09-05 NOTE — CONSULT NOTE ADULT - ASSESSMENT
Patient is 68F with 3 weeks of fever of unknown origin and positive blood cultures, found to have porcelain gallbladder and dilated CBD and intrahepatic ducts on imaging.  Source of bacteremia still unknown.    - Agree with GI recs for MRCP, will follow up result  - Will discuss elective cholecystectomy with patient  - pending TTE to r.o endocarditis  - case discussed with attending Dr. 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  Source of bacteremia still unknown.    - Agree with GI recs for MRCP, will follow up result  - AM CEA and CA 19  - Will discuss elective cholecystectomy with patient  - pending TTE to r.o endocarditis  - case discussed with attending Dr. Tone Macias PGY2  [id number]

## 2018-09-05 NOTE — CONSULT NOTE ADULT - ATTENDING COMMENTS
Danni Johnson MD  Pager: [phone]  After 5 PM or weekends please call fellow on call or office [phone]
68 year old female admitted with 3 weeks of fever to 102F. Found to have positive blood cultures and a dilated CBD to 15mm, in addition to a porcelain GB.     Plan: MRCP

## 2018-09-05 NOTE — DISCHARGE NOTE ADULT - PATIENT PORTAL LINK FT
You can access the [email or web address], offered by [facility], by registering with the following website: [email or web address]

## 2018-09-05 NOTE — CONSULT NOTE ADULT - SUBJECTIVE AND OBJECTIVE BOX
patient seen, full consult note to follow CC: 68y old Female admitted with a chief complaint of called back for GNR bacteremia (05 Sep 2018 10:12)  , now    HPI: 69 y/o F PMH HTN called back to ED for BCx at 54 hours + for GNR in 1 bottle.  Pt was evaluated 2 days prior in the ED for fever of unclear etiology w/ TMax 102 with 19 days of fever prior to coming into the hospital. Describes occasional non-productive cough which has now resolved, along with urinary symptoms consisting of L flank pain x 3 weeks and polyuria, which have now completely resolved as well. Patient denies any recent travel or sick contacts.  No recent dental work. No abdominal pain, diarrhea, dysuria, SOB, chest pain, or naseau/vomiting.  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  Surgery called to evaluate.      PMHx: HTN (hypertension)  No pertinent past medical history    PSHx: No significant past surgical history    Medications (inpatient): ATENolol  Tablet 50 milliGRAM(s) Oral daily  enoxaparin Injectable 40 milliGRAM(s) SubCutaneous daily  hydrochlorothiazide 25 milliGRAM(s) Oral daily  influenza   Vaccine 0.5 milliLiter(s) IntraMuscular once  piperacillin/tazobactam IVPB. 3.375 Gram(s) IV Intermittent every 8 hours  sodium chloride 0.9%. 1000 milliLiter(s) IV Continuous <Continuous>    Allergies: No Known Allergies  Family Hx: No pertinent family history in first degree relatives      Physical Exam  T(C): 37  HR: 76 (76 - 81)  BP: 107/57 (107/57 - 137/81)  RR: 17 (17 - 18)  SpO2: 100% (95% - 100%)  Tmax: T(C): , Max: 37.5 (18 @ 21:15)    18  -  18  --------------------------------------------------------  IN:    Oral Fluid: 200 mL  Total IN: 200 mL    OUT:    Voided: 1050 mL  Total OUT: 1050 mL    Total NET: -850 mL      18  -  09-05-18  --------------------------------------------------------  IN:    Oral Fluid: 200 mL  Total IN: 200 mL    OUT:    Voided: 400 mL  Total OUT: 400 mL    Total NET: -200 mL      General: well developed, well nourished, NAD  Neuro: alert and oriented, no focal deficits, moves all extremities spontaneously  HEENT: NCAT, EOMI, anicteric, mucosa moist  Respiratory: airway patent, respirations unlabored  CVS: regular rate and rhythm  Abdomen: soft, nontender, nondistended  Extremities: no edema, sensation and movement grossly intact  Skin: warm, dry, appropriate color    Labs:                        12.6   10.38 )-----------( 353      ( 05 Sep 2018 08:45 )             35.5     PT/INR - ( 04 Sep 2018 07:00 )   PT: 12.2 SEC;   INR: 1.10          PTT - ( 04 Sep 2018 07:00 )  PTT:29.4 SEC      136  |  98  |  18  ----------------------------<  179<H>  4.6   |  25  |  0.93    Ca    9.5      05 Sep 2018 08:45  Phos  3.1       Mg     2.3         TPro  7.5  /  Alb  3.7  /  TBili  0.8  /  DBili  x   /  AST  17  /  ALT  16  /  AlkPhos  104  04    Urinalysis Basic - ( 05 Sep 2018 11:30 )    Color: YELLOW / Appearance: CLEAR / S.034 / pH: 6.5  Gluc: 200 / Ketone: NEGATIVE  / Bili: NEGATIVE / Urobili: NORMAL   Blood: NEGATIVE / Protein: TRACE / Nitrite: NEGATIVE   Leuk Esterase: NEGATIVE / RBC: 0-2 / WBC 0-2   Sq Epi: FEW / Non Sq Epi: x / Bacteria: NEGATIVE            Imaging and other studies:  < from: CT Abdomen and Pelvis w/ IV Cont (18 @ 12:25) >  IMPRESSION:   Dilated CBD and mild intrahepatic biliary dilatation; suggestMRI and/or   GI consultation.    Porcelain gallbladder.    < end of copied text >

## 2018-09-05 NOTE — DISCHARGE NOTE ADULT - CARE PLAN
Principal Discharge DX:	Bacteremia  Goal:	Treat and analyze  Assessment and plan of treatment:	You were admitted for gram negative lucrecia bacteremia which did not speciate on lab testing; this was sent to the [facility] Department of Health for further testing.  Infectious Disease was consulted and they recommended continuing IV antibiotics.  Your repeat blood cultures were negative.  Secondary Diagnosis:	HTN (hypertension)  Goal:	Treat  Assessment and plan of treatment:	Please take your medications as prescribed and follow up with your primary care provider within 1-2 weeks of discharge.  Secondary Diagnosis:	Porcelain gallbladder  Goal:	Treat  Assessment and plan of treatment:	We did a CT scan of your abdomen to look for a source of your bacteremia.  You were found to have a porcelain gallbladder, which is concerning for gallbladder malignancy.  We consulted general surgery, who recommended _______________.  Please follow up with your primary care provider and general surgery within 1-2 weeks of discharge. Principal Discharge DX:	Bacteremia  Assessment and plan of treatment:	You were admitted for bacteria growing in your blood, which did not speciate on lab testing; this was sent to the [facility] Department of Health for further testing.  Infectious Disease was consulted and they recommended continuing IV antibiotics.  Your repeat blood cultures were negative.  Secondary Diagnosis:	HTN (hypertension)  Goal:	Continue taking your medications as prescribed  Assessment and plan of treatment:	Please take your medications as prescribed and follow up with your primary care provider within 1-2 weeks of discharge.  Secondary Diagnosis:	Porcelain gallbladder  Goal:	Follow up with General Surgery Clinic.  Assessment and plan of treatment:	We did a CT scan of your abdomen to look for a source of your bacteremia.  You were found to have a porcelain gallbladder, which is concerning for gallbladder malignancy.  We consulted general surgery, who recommended _______________.  Please follow up with your primary care provider and general surgery within 1-2 weeks of discharge.  Secondary Diagnosis:	Diabetes  Secondary Diagnosis:	Rash/skin eruption  Secondary Diagnosis:	Urinary retention Principal Discharge DX:	Bacteremia  Goal:	Please complete your 5 day course of antibiotic (Augmentin, twice a day). Please follow up with your Primary Care Physician within 1-2 weeks.  Assessment and plan of treatment:	You were admitted for bacteria growing in your blood, which had to be sent to the [facility] Department of Health for further testing. Infectious Disease was consulted and they recommended continuing IV antibiotics.  Your repeat blood cultures were negative. You will be discharged on oral antibiotic which you will take for 5 more days. Please follow up with your primary care physician within 1-2 weeks.  Secondary Diagnosis:	HTN (hypertension)  Goal:	Continue taking your medications as prescribed  Assessment and plan of treatment:	Please take your medications as prescribed and follow up with your primary care provider within 1-2 weeks of discharge.  Secondary Diagnosis:	Porcelain gallbladder  Goal:	Follow up with General Surgery Clinic.  Assessment and plan of treatment:	We did a CT scan of your abdomen to look for a source of your bacteremia.  You were incidentally found to have a porcelain gallbladder, which puts you at higher risk for gallbladder malignancy.  We consulted general surgery, who recommended elective cholecystectomy (removal of your gallbladder). Please follow up with general surgery clinic. You will receive a call to schedule your appointment.

## 2018-09-05 NOTE — PROGRESS NOTE ADULT - ASSESSMENT
67 y/o F PMH HTN returning to ED today after being found to have GNR bacteremia on previous BCx drawn 2 days ago in the setting of 19 days of fever of unclear etiology. Possible sources of infection include GI vs UTI. CT A/P showing dilated CBD. GI consulted and MRCP pending.

## 2018-09-05 NOTE — DISCHARGE NOTE ADULT - MEDICATION SUMMARY - MEDICATIONS TO TAKE
I will START or STAY ON the medications listed below when I get home from the hospital:    atenolol-chlorthalidone 50 mg-25 mg oral tablet  -- 1 tab(s) by mouth once a day  -- Indication: For Hypertension    Augmentin 875 mg-125 mg oral tablet  -- 875 milligram(s) by mouth 2 times a day   -- Finish all this medication unless otherwise directed by prescriber.  Take with food or milk.    -- Indication: For Bacteremia

## 2018-09-05 NOTE — CONSULT NOTE ADULT - ASSESSMENT
NOTE IS INCOMPLETE    Crystal Mcpherson, PGY-5  GI fellow  B- [phone]/ [phone]  Please call GI fellow on call after 5pm and on weekends 68 year old female with HTN and arthritis presented to the ER with chief complain of fever for 3 weeks. Blood cultures positive for gram variable rods in 1 bottle and CT scan done showed dilated CBD with procelain gallbladder.     IMPRESSION  - Bacteremia with gram variable rods, unknown source, currently on Zosyn  - CBD dilation to 15mm and porcelain gallbladder    RECOMMENDATION    - trend CBC, CMP, INR  - pending MRCP  - will consider endoscopic interventions based on the MRCP findings  - pending TTE to rule out endocarditis  - supportive care as per primary team      Crystal Mcpherson, PGY-5  GI fellow  B- [id number]/ [phone]  Please call GI fellow on call after 5pm and on weekends

## 2018-09-05 NOTE — DISCHARGE NOTE ADULT - PLAN OF CARE
Treat and analyze You were admitted for gram negative lucrecia bacteremia which did not speciate on lab testing; this was sent to the [facility] Department of Health for further testing.  Infectious Disease was consulted and they recommended continuing IV antibiotics.  Your repeat blood cultures were negative. Treat Please take your medications as prescribed and follow up with your primary care provider within 1-2 weeks of discharge. We did a CT scan of your abdomen to look for a source of your bacteremia.  You were found to have a porcelain gallbladder, which is concerning for gallbladder malignancy.  We consulted general surgery, who recommended _______________.  Please follow up with your primary care provider and general surgery within 1-2 weeks of discharge. You were admitted for bacteria growing in your blood, which did not speciate on lab testing; [facility] for [facility] testing.  Infectious Disease was consulted and they recommended continuing IV antibiotics.  Your repeat blood cultures were negative. Continue taking your medications as prescribed Follow up with General Surgery Clinic. Please complete your 5 day course of antibiotic (Augmentin, twice a day). Please follow up with your Primary Care Physician within 1-2 weeks. You were admitted for bacteria growing in your blood, which had to be sent to the [facility] Department of Health for further testing. Infectious Disease was consulted and they recommended continuing IV antibiotics.  Your repeat blood cultures were negative. You will be discharged on oral antibiotic which you will take for 5 more days. Please follow up with your primary care physician within 1-2 weeks. We did a CT scan of your abdomen to look for a source of your bacteremia.  You were incidentally found to have a porcelain gallbladder, which puts you at higher risk for gallbladder malignancy.  We consulted general surgery, who recommended elective cholecystectomy (removal of your gallbladder). Please follow up with general surgery clinic. You will receive a call to schedule your appointment.

## 2018-09-05 NOTE — DISCHARGE NOTE ADULT - ADDITIONAL INSTRUCTIONS
Please take your medications as prescribed.  Please follow up with your primary care provider, infectious disease, and general surgery within 1-2 weeks of discharge. Please take your medications as prescribed.  Please follow up with your primary care provider and general surgery clinic within 1-2 weeks of discharge. -Please complete your 5 day course of antibiotic.  -Please take your medications as prescribed.  -Please follow up with your primary care provider within 1-2 weeks of discharge.  -Please follow up with General surgery clinic. You will receive a phone call from the clinic to schedule an appt.

## 2018-09-06 LAB
BACTERIA UR CULT: SIGNIFICANT CHANGE UP
BASOPHILS # BLD AUTO: 0.06 K/UL — SIGNIFICANT CHANGE UP (ref 0–0.2)
BASOPHILS NFR BLD AUTO: 0.7 % — SIGNIFICANT CHANGE UP (ref 0–2)
BUN SERPL-MCNC: 27 MG/DL — HIGH (ref 7–23)
CALCIUM SERPL-MCNC: 9.3 MG/DL — SIGNIFICANT CHANGE UP (ref 8.4–10.5)
CEA SERPL-MCNC: 2.5 NG/ML — SIGNIFICANT CHANGE UP (ref 1–3.8)
CHLORIDE SERPL-SCNC: 100 MMOL/L — SIGNIFICANT CHANGE UP (ref 98–107)
CO2 SERPL-SCNC: 23 MMOL/L — SIGNIFICANT CHANGE UP (ref 22–31)
CREAT SERPL-MCNC: 1.03 MG/DL — SIGNIFICANT CHANGE UP (ref 0.5–1.3)
EOSINOPHIL # BLD AUTO: 0.22 K/UL — SIGNIFICANT CHANGE UP (ref 0–0.5)
EOSINOPHIL NFR BLD AUTO: 2.7 % — SIGNIFICANT CHANGE UP (ref 0–6)
GLUCOSE SERPL-MCNC: 146 MG/DL — HIGH (ref 70–99)
HCT VFR BLD CALC: 35.9 % — SIGNIFICANT CHANGE UP (ref 34.5–45)
HGB BLD-MCNC: 12.3 G/DL — SIGNIFICANT CHANGE UP (ref 11.5–15.5)
IMM GRANULOCYTES # BLD AUTO: 0.04 # — SIGNIFICANT CHANGE UP
IMM GRANULOCYTES NFR BLD AUTO: 0.5 % — SIGNIFICANT CHANGE UP (ref 0–1.5)
LYMPHOCYTES # BLD AUTO: 2.76 K/UL — SIGNIFICANT CHANGE UP (ref 1–3.3)
LYMPHOCYTES # BLD AUTO: 33.7 % — SIGNIFICANT CHANGE UP (ref 13–44)
MAGNESIUM SERPL-MCNC: 2.5 MG/DL — SIGNIFICANT CHANGE UP (ref 1.6–2.6)
MCHC RBC-ENTMCNC: 33.1 PG — SIGNIFICANT CHANGE UP (ref 27–34)
MCHC RBC-ENTMCNC: 34.3 % — SIGNIFICANT CHANGE UP (ref 32–36)
MCV RBC AUTO: 96.5 FL — SIGNIFICANT CHANGE UP (ref 80–100)
MONOCYTES # BLD AUTO: 0.76 K/UL — SIGNIFICANT CHANGE UP (ref 0–0.9)
MONOCYTES NFR BLD AUTO: 9.3 % — SIGNIFICANT CHANGE UP (ref 2–14)
NEUTROPHILS # BLD AUTO: 4.36 K/UL — SIGNIFICANT CHANGE UP (ref 1.8–7.4)
NEUTROPHILS NFR BLD AUTO: 53.1 % — SIGNIFICANT CHANGE UP (ref 43–77)
NRBC # FLD: 0 — SIGNIFICANT CHANGE UP
PHOSPHATE SERPL-MCNC: 4 MG/DL — SIGNIFICANT CHANGE UP (ref 2.5–4.5)
PLATELET # BLD AUTO: 364 K/UL — SIGNIFICANT CHANGE UP (ref 150–400)
PMV BLD: 9.9 FL — SIGNIFICANT CHANGE UP (ref 7–13)
POTASSIUM SERPL-MCNC: 3.6 MMOL/L — SIGNIFICANT CHANGE UP (ref 3.5–5.3)
POTASSIUM SERPL-SCNC: 3.6 MMOL/L — SIGNIFICANT CHANGE UP (ref 3.5–5.3)
RBC # BLD: 3.72 M/UL — LOW (ref 3.8–5.2)
RBC # FLD: 12.1 % — SIGNIFICANT CHANGE UP (ref 10.3–14.5)
SODIUM SERPL-SCNC: 138 MMOL/L — SIGNIFICANT CHANGE UP (ref 135–145)
SPECIMEN SOURCE: SIGNIFICANT CHANGE UP
WBC # BLD: 8.2 K/UL — SIGNIFICANT CHANGE UP (ref 3.8–10.5)
WBC # FLD AUTO: 8.2 K/UL — SIGNIFICANT CHANGE UP (ref 3.8–10.5)

## 2018-09-06 PROCEDURE — 99233 SBSQ HOSP IP/OBS HIGH 50: CPT | Mod: GC

## 2018-09-06 PROCEDURE — 93306 TTE W/DOPPLER COMPLETE: CPT | Mod: 26

## 2018-09-06 PROCEDURE — 99232 SBSQ HOSP IP/OBS MODERATE 35: CPT | Mod: GC

## 2018-09-06 RX ADMIN — Medication 25 MILLIGRAM(S): at 05:22

## 2018-09-06 RX ADMIN — ATENOLOL 50 MILLIGRAM(S): 25 TABLET ORAL at 05:21

## 2018-09-06 RX ADMIN — Medication 650 MILLIGRAM(S): at 00:25

## 2018-09-06 RX ADMIN — SODIUM CHLORIDE 50 MILLILITER(S): 9 INJECTION INTRAMUSCULAR; INTRAVENOUS; SUBCUTANEOUS at 05:22

## 2018-09-06 RX ADMIN — PIPERACILLIN AND TAZOBACTAM 25 GRAM(S): 4; .5 INJECTION, POWDER, LYOPHILIZED, FOR SOLUTION INTRAVENOUS at 22:01

## 2018-09-06 RX ADMIN — SODIUM CHLORIDE 50 MILLILITER(S): 9 INJECTION INTRAMUSCULAR; INTRAVENOUS; SUBCUTANEOUS at 22:01

## 2018-09-06 RX ADMIN — SODIUM CHLORIDE 50 MILLILITER(S): 9 INJECTION INTRAMUSCULAR; INTRAVENOUS; SUBCUTANEOUS at 13:25

## 2018-09-06 RX ADMIN — PIPERACILLIN AND TAZOBACTAM 25 GRAM(S): 4; .5 INJECTION, POWDER, LYOPHILIZED, FOR SOLUTION INTRAVENOUS at 05:22

## 2018-09-06 RX ADMIN — PIPERACILLIN AND TAZOBACTAM 25 GRAM(S): 4; .5 INJECTION, POWDER, LYOPHILIZED, FOR SOLUTION INTRAVENOUS at 13:25

## 2018-09-06 NOTE — PROGRESS NOTE ADULT - ASSESSMENT
67 y/o F PMH HTN, gout p/w fever 2/2 GNR and gram variable lucrecia bacteremia, concern for abd source.    - UA from 8/31 negative  - BCx 8/31- gnr, gram variable rods- await speciation  - Repeat BCx 9/3 neg to date  - CT a/p- dilated CBD, porcelain GB  - MRCP- 1.4 stone in GB neck  - GI follow up appreciated- plan for cholecystectomy vs ERCP  - surgery follow up  - c/w zosyn; abx duration and potential for change to PO to be determined based on clinical course and possible GI vs surgical intervention

## 2018-09-06 NOTE — PROGRESS NOTE ADULT - ATTENDING COMMENTS
Danni Johnson MD  Pager: [phone]  After 5 PM or weekends please call fellow on call or office [phone]

## 2018-09-06 NOTE — PROGRESS NOTE ADULT - SUBJECTIVE AND OBJECTIVE BOX
Follow Up:  Overnight no events. Pt wants to go home. No abd pain, n/v. Eating/drinking well.      ROS:    All other systems negative    Constitutional: no fever, no chills  Head: no trauma  Eyes: no vision changes, no eye pain  ENT:  no sore throat, no rhinorrhea  Cardiovascular:  no chest pain, no palpitation  Respiratory:  no SOB, no cough  GI:  no abd pain, no vomiting, no diarrhea  urinary: no dysuria, no hematuria, no flank pain  musculoskeletal:  no joint pain, no joint swelling  skin:  no rash  neurology:  no headache, no seizure, no change in mental status  psych: no anxiety, no depression         Allergies  No Known Allergies        ANTIMICROBIALS:  piperacillin/tazobactam IVPB. 3.375 every 8 hours      OTHER MEDS:  acetaminophen   Tablet .. 650 milliGRAM(s) Oral every 6 hours PRN  ATENolol  Tablet 50 milliGRAM(s) Oral daily  enoxaparin Injectable 40 milliGRAM(s) SubCutaneous daily  hydrochlorothiazide 25 milliGRAM(s) Oral daily  influenza   Vaccine 0.5 milliLiter(s) IntraMuscular once  sodium chloride 0.9%. 1000 milliLiter(s) IV Continuous <Continuous>      Vital Signs Last 24 Hrs  T(C): 37 (06 Sep 2018 14:52), Max: 37.2 (05 Sep 2018 21:32)  T(F): 98.6 (06 Sep 2018 14:52), Max: 99 (05 Sep 2018 21:32)  HR: 72 (06 Sep 2018 14:52) (66 - 76)  BP: 122/70 (06 Sep 2018 14:52) (114/61 - 130/74)  BP(mean): --  RR: 16 (06 Sep 2018 14:52) (16 - 18)  SpO2: 98% (06 Sep 2018 14:52) (95% - 99%)    Physical Exam:  General:    NAD,  non toxic, A&O x 3  Head: atraumatic, normocephalic  Eye: normal sclera and conjunctiva  ENT:    no oropharyngeal lesions,   no LAD,   neck supple  Cardio:     regular S1, S2  Respiratory:    clear b/l,    no wheezing  abd:     soft,   BS +,   no tenderness,    no organomegaly  :   no CVAT,  no suprapubic tenderness,   no  friedman  Musculoskeletal:   no joint swelling,   no edema  vascular: no lines, normal pulses  Skin:    no rash  Neurologic:     no focal deficit  psych: normal affect, no suicidal ideation                          12.3   8.20  )-----------( 364      ( 06 Sep 2018 06:55 )             35.9           138  |  100  |  27<H>  ----------------------------<  146<H>  3.6   |  23  |  1.03    Ca    9.3      06 Sep 2018 06:55  Phos  4.0       Mg     2.5             Urinalysis Basic - ( 05 Sep 2018 11:30 )    Color: YELLOW / Appearance: CLEAR / S.034 / pH: 6.5  Gluc: 200 / Ketone: NEGATIVE  / Bili: NEGATIVE / Urobili: NORMAL   Blood: NEGATIVE / Protein: TRACE / Nitrite: NEGATIVE   Leuk Esterase: NEGATIVE / RBC: 0-2 / WBC 0-2   Sq Epi: FEW / Non Sq Epi: x / Bacteria: NEGATIVE        MICROBIOLOGY:  v  URINE MIDSTREAM  18 neg to date      BLOOD PERIPHERAL  18 neg to date      BLOOD PERIPHERAL  18 --  --  BLOOD CULTURE PCR  GRAM VARIABLE RODS    RADIOLOGY:    MRCP- 1.4 cm stone in the gallbladder neck. Porcelain gallbladder.    Borderline dilated intra and extrahepatic biliary system of indeterminate   significance. No choledocholithiasis. No evidence for neoplasm.    CXR- Clear lungs.()    CT a/p-Dilated CBD and mild intrahepatic biliary dilatation; suggest MRI and/or   GI consultation.Porcelain gallbladder.

## 2018-09-06 NOTE — PROGRESS NOTE ADULT - ASSESSMENT
67 y/o F PMH HTN returning to ED today after being found to have GNR bacteremia on previous BCx drawn 2 days ago in the setting of 19 days of fever of unclear etiology. Possible sources of infection include GI vs UTI. CT A/P showing dilated CBD and porcelain gallbladder. MRCP showing cholelithiasis w/o choledocholithiasis. GI, Surg consulted.

## 2018-09-06 NOTE — PROGRESS NOTE ADULT - PROBLEM SELECTOR PLAN 2
Will need cholecystectomy at some point.  -f/u Surg recs for possible elective cholecystectomy  -f/u CEA and CA 19-9

## 2018-09-06 NOTE — PROGRESS NOTE ADULT - SUBJECTIVE AND OBJECTIVE BOX
Hossein Vargas PGY-1   [facility] Pager # [phone]  NS Pager # [phone]      Patient is a 68y old  Female who presents with a chief complaint of called back for GNR bacteremia (05 Sep 2018 17:09)      SUBJECTIVE: No acute events overnight. Patient seen and examined at bedside.    REVIEW OF SYSTEMS:  CONSTITUTIONAL: No fever, weight loss, or fatigue  RESPIRATORY: No cough or shortness of breath  CARDIOVASCULAR: No chest pain, palpitations, dizziness, or leg swelling  GASTROINTESTINAL: No abdominal or epigastric pain. No nausea, vomiting, or hematemesis; No diarrhea or constipation. No melena or hematochezia.  GENITOURINARY: No dysuria  NEUROLOGICAL: No headaches  SKIN: No itching or lesions       MEDICATIONS  (STANDING):  ATENolol  Tablet 50 milliGRAM(s) Oral daily  enoxaparin Injectable 40 milliGRAM(s) SubCutaneous daily  hydrochlorothiazide 25 milliGRAM(s) Oral daily  influenza   Vaccine 0.5 milliLiter(s) IntraMuscular once  piperacillin/tazobactam IVPB. 3.375 Gram(s) IV Intermittent every 8 hours  sodium chloride 0.9%. 1000 milliLiter(s) (50 mL/Hr) IV Continuous <Continuous>    MEDICATIONS  (PRN):  acetaminophen   Tablet .. 650 milliGRAM(s) Oral every 6 hours PRN Moderate Pain (4 - 6)        Objective:    Vitals: Vital Signs Last 24 Hrs  T(C): 36.6 (18 @ 05:15), Max: 37.2 (18 @ 21:32)  T(F): 97.8 (18 @ 05:15), Max: 99 (18 @ 21:32)  HR: 66 (18 @ 05:15) (66 - 76)  BP: 130/74 (18 @ 05:15) (107/57 - 130/74)  BP(mean): --  RR: 16 (18 @ 05:15) (16 - 18)  SpO2: 99% (18 @ 05:15) (95% - 100%)            I&O's Summary    05 Sep 2018 07:01  -  06 Sep 2018 07:00  --------------------------------------------------------  IN: 500 mL / OUT: 400 mL / NET: 100 mL        PHYSICAL EXAM:  GENERAL: NAD  HEAD:  Atraumatic, Normocephalic  CHEST/LUNG: Clear to auscultation bilaterally; No rales or wheezing  HEART: Regular rate and rhythm; No murmurs, rubs, or gallops  ABDOMEN: Soft, nontender, nondistended  EXTREMITIES:  No clubbing, cyanosis, or edema  LYMPH: No lymphadenopathy noted  SKIN: No rashes or lesions  NERVOUS SYSTEM:  Alert & Oriented X3    LABS:      136  |  98  |  18  ----------------------------<  179<H>  4.6   |  25  |  0.93      137  |  95<L>  |  21  ----------------------------<  147<H>  2.9<LL>   |  27  |  0.98      140  |  96<L>  |  27<H>  ----------------------------<  142<H>  3.5   |  28  |  1.11    Ca    9.5      05 Sep 2018 08:45  Ca    9.1      04 Sep 2018 07:00  Ca    9.8      03 Sep 2018 16:40  Phos  3.1       Mg     2.3         TPro  7.5  /  Alb  3.7  /  TBili  0.8  /  DBili  x   /  AST  17  /  ALT  16  /  AlkPhos  104    TPro  8.4<H>  /  Alb  4.3  /  TBili  0.4  /  DBili  x   /  AST  19  /  ALT  19  /  AlkPhos  134<H>                    Urinalysis Basic - ( 05 Sep 2018 11:30 )    Color: YELLOW / Appearance: CLEAR / S.034 / pH: 6.5  Gluc: 200 / Ketone: NEGATIVE  / Bili: NEGATIVE / Urobili: NORMAL   Blood: NEGATIVE / Protein: TRACE / Nitrite: NEGATIVE   Leuk Esterase: NEGATIVE / RBC: 0-2 / WBC 0-2   Sq Epi: FEW / Non Sq Epi: x / Bacteria: NEGATIVE                              12.6   10.38 )-----------( 353      ( 05 Sep 2018 08:45 )             35.5                         12.5   8.16  )-----------( 369      ( 04 Sep 2018 07:00 )             35.4                         13.6   8.39  )-----------( 424      ( 03 Sep 2018 16:10 )             39.4     CAPILLARY BLOOD GLUCOSE        RADIOLOGY:  Imaging Personally Reviewed: YES      Consultants involved in case: YES  Consultant(s) Notes Reviewed:  YES  Care Discussed with Consultants/Other Providers       Hossein Vargas, PGY-1 Hossein Vargas PGY-1   [facility] Pager # [phone]  NS Pager # [phone]      Patient is a 68y old  Female who presents with a chief complaint of called back for GNR bacteremia (05 Sep 2018 17:09)      SUBJECTIVE: No acute events overnight. Patient seen and examined at bedside. No complaints this AM. Patient denies fever, chills, abdominal pain, or dysuria.    REVIEW OF SYSTEMS:  CONSTITUTIONAL: No fever, weight loss, or fatigue  RESPIRATORY: No cough or shortness of breath  CARDIOVASCULAR: No chest pain, palpitations, dizziness, or leg swelling  GASTROINTESTINAL: No abdominal or epigastric pain. No nausea, vomiting, or hematemesis; No diarrhea or constipation. No melena or hematochezia.  GENITOURINARY: No dysuria  NEUROLOGICAL: No headaches  SKIN: No itching or lesions       MEDICATIONS  (STANDING):  ATENolol  Tablet 50 milliGRAM(s) Oral daily  enoxaparin Injectable 40 milliGRAM(s) SubCutaneous daily  hydrochlorothiazide 25 milliGRAM(s) Oral daily  influenza   Vaccine 0.5 milliLiter(s) IntraMuscular once  piperacillin/tazobactam IVPB. 3.375 Gram(s) IV Intermittent every 8 hours  sodium chloride 0.9%. 1000 milliLiter(s) (50 mL/Hr) IV Continuous <Continuous>    MEDICATIONS  (PRN):  acetaminophen   Tablet .. 650 milliGRAM(s) Oral every 6 hours PRN Moderate Pain (4 - 6)      Objective:    Vitals: Vital Signs Last 24 Hrs  T(C): 36.6 (18 @ 05:15), Max: 37.2 (18 @ 21:32)  T(F): 97.8 (18 @ 05:15), Max: 99 (18 @ 21:32)  HR: 66 (18 @ 05:15) (66 - 76)  BP: 130/74 (18 @ 05:15) (107/57 - 130/74)  BP(mean): --  RR: 16 (18 @ 05:15) (16 - 18)  SpO2: 99% (18 @ 05:15) (95% - 100%)            I&O's Summary    05 Sep 2018 07:01  -  06 Sep 2018 07:00  --------------------------------------------------------  IN: 500 mL / OUT: 400 mL / NET: 100 mL        PHYSICAL EXAM:  GENERAL: NAD  HEAD:  Atraumatic, Normocephalic  CHEST/LUNG: Clear to auscultation bilaterally; No rales or wheezing  HEART: Regular rate and rhythm; No murmurs, rubs, or gallops  ABDOMEN: Soft, nontender, nondistended  EXTREMITIES:  No clubbing, cyanosis, or edema  LYMPH: No lymphadenopathy noted  SKIN: No rashes or lesions  NERVOUS SYSTEM:  Alert & Oriented X3    LABS:      136  |  98  |  18  ----------------------------<  179<H>  4.6   |  25  |  0.93      137  |  95<L>  |  21  ----------------------------<  147<H>  2.9<LL>   |  27  |  0.98      140  |  96<L>  |  27<H>  ----------------------------<  142<H>  3.5   |  28  |  1.11    Ca    9.5      05 Sep 2018 08:45  Ca    9.1      04 Sep 2018 07:00  Ca    9.8      03 Sep 2018 16:40  Phos  3.1       Mg     2.3         TPro  7.5  /  Alb  3.7  /  TBili  0.8  /  DBili  x   /  AST  17  /  ALT  16  /  AlkPhos  104    TPro  8.4<H>  /  Alb  4.3  /  TBili  0.4  /  DBili  x   /  AST  19  /  ALT  19  /  AlkPhos  134<H>                    Urinalysis Basic - ( 05 Sep 2018 11:30 )    Color: YELLOW / Appearance: CLEAR / S.034 / pH: 6.5  Gluc: 200 / Ketone: NEGATIVE  / Bili: NEGATIVE / Urobili: NORMAL   Blood: NEGATIVE / Protein: TRACE / Nitrite: NEGATIVE   Leuk Esterase: NEGATIVE / RBC: 0-2 / WBC 0-2   Sq Epi: FEW / Non Sq Epi: x / Bacteria: NEGATIVE                              12.6   10.38 )-----------( 353      ( 05 Sep 2018 08:45 )             35.5                         12.5   8.16  )-----------( 369      ( 04 Sep 2018 07:00 )             35.4                         13.6   8.39  )-----------( 424      ( 03 Sep 2018 16:10 )             39.4     CAPILLARY BLOOD GLUCOSE        RADIOLOGY:  Imaging Personally Reviewed: YES      Consultants involved in case: YES  Consultant(s) Notes Reviewed:  YES  Care Discussed with Consultants/Other Providers       Hossein Vargas, PGY-1

## 2018-09-07 VITALS
SYSTOLIC BLOOD PRESSURE: 139 MMHG | OXYGEN SATURATION: 98 % | HEART RATE: 69 BPM | TEMPERATURE: 98 F | RESPIRATION RATE: 17 BRPM | DIASTOLIC BLOOD PRESSURE: 58 MMHG

## 2018-09-07 LAB
ALBUMIN SERPL ELPH-MCNC: 3.7 G/DL — SIGNIFICANT CHANGE UP (ref 3.3–5)
ALP SERPL-CCNC: 95 U/L — SIGNIFICANT CHANGE UP (ref 40–120)
ALT FLD-CCNC: 13 U/L — SIGNIFICANT CHANGE UP (ref 4–33)
AST SERPL-CCNC: 16 U/L — SIGNIFICANT CHANGE UP (ref 4–32)
BASOPHILS # BLD AUTO: 0.05 K/UL — SIGNIFICANT CHANGE UP (ref 0–0.2)
BASOPHILS NFR BLD AUTO: 0.5 % — SIGNIFICANT CHANGE UP (ref 0–2)
BILIRUB SERPL-MCNC: 0.6 MG/DL — SIGNIFICANT CHANGE UP (ref 0.2–1.2)
BUN SERPL-MCNC: 17 MG/DL — SIGNIFICANT CHANGE UP (ref 7–23)
CALCIUM SERPL-MCNC: 9.2 MG/DL — SIGNIFICANT CHANGE UP (ref 8.4–10.5)
CANCER AG19-9 SERPL-ACNC: 73 U/ML — HIGH
CHLORIDE SERPL-SCNC: 99 MMOL/L — SIGNIFICANT CHANGE UP (ref 98–107)
CO2 SERPL-SCNC: 23 MMOL/L — SIGNIFICANT CHANGE UP (ref 22–31)
CREAT SERPL-MCNC: 0.83 MG/DL — SIGNIFICANT CHANGE UP (ref 0.5–1.3)
EOSINOPHIL # BLD AUTO: 0.38 K/UL — SIGNIFICANT CHANGE UP (ref 0–0.5)
EOSINOPHIL NFR BLD AUTO: 3.9 % — SIGNIFICANT CHANGE UP (ref 0–6)
GLUCOSE SERPL-MCNC: 128 MG/DL — HIGH (ref 70–99)
HCT VFR BLD CALC: 34 % — LOW (ref 34.5–45)
HGB BLD-MCNC: 11.8 G/DL — SIGNIFICANT CHANGE UP (ref 11.5–15.5)
IMM GRANULOCYTES # BLD AUTO: 0.04 # — SIGNIFICANT CHANGE UP
IMM GRANULOCYTES NFR BLD AUTO: 0.4 % — SIGNIFICANT CHANGE UP (ref 0–1.5)
LYMPHOCYTES # BLD AUTO: 3.87 K/UL — HIGH (ref 1–3.3)
LYMPHOCYTES # BLD AUTO: 39.2 % — SIGNIFICANT CHANGE UP (ref 13–44)
MAGNESIUM SERPL-MCNC: 2.3 MG/DL — SIGNIFICANT CHANGE UP (ref 1.6–2.6)
MCHC RBC-ENTMCNC: 33.1 PG — SIGNIFICANT CHANGE UP (ref 27–34)
MCHC RBC-ENTMCNC: 34.7 % — SIGNIFICANT CHANGE UP (ref 32–36)
MCV RBC AUTO: 95.2 FL — SIGNIFICANT CHANGE UP (ref 80–100)
MONOCYTES # BLD AUTO: 0.74 K/UL — SIGNIFICANT CHANGE UP (ref 0–0.9)
MONOCYTES NFR BLD AUTO: 7.5 % — SIGNIFICANT CHANGE UP (ref 2–14)
NEUTROPHILS # BLD AUTO: 4.78 K/UL — SIGNIFICANT CHANGE UP (ref 1.8–7.4)
NEUTROPHILS NFR BLD AUTO: 48.5 % — SIGNIFICANT CHANGE UP (ref 43–77)
NRBC # FLD: 0 — SIGNIFICANT CHANGE UP
PHOSPHATE SERPL-MCNC: 3.6 MG/DL — SIGNIFICANT CHANGE UP (ref 2.5–4.5)
PLATELET # BLD AUTO: 376 K/UL — SIGNIFICANT CHANGE UP (ref 150–400)
PMV BLD: 10.1 FL — SIGNIFICANT CHANGE UP (ref 7–13)
POTASSIUM SERPL-MCNC: 3.9 MMOL/L — SIGNIFICANT CHANGE UP (ref 3.5–5.3)
POTASSIUM SERPL-SCNC: 3.9 MMOL/L — SIGNIFICANT CHANGE UP (ref 3.5–5.3)
PROT SERPL-MCNC: 7.5 G/DL — SIGNIFICANT CHANGE UP (ref 6–8.3)
RBC # BLD: 3.57 M/UL — LOW (ref 3.8–5.2)
RBC # FLD: 11.9 % — SIGNIFICANT CHANGE UP (ref 10.3–14.5)
SODIUM SERPL-SCNC: 136 MMOL/L — SIGNIFICANT CHANGE UP (ref 135–145)
WBC # BLD: 9.86 K/UL — SIGNIFICANT CHANGE UP (ref 3.8–10.5)
WBC # FLD AUTO: 9.86 K/UL — SIGNIFICANT CHANGE UP (ref 3.8–10.5)

## 2018-09-07 PROCEDURE — 99232 SBSQ HOSP IP/OBS MODERATE 35: CPT

## 2018-09-07 PROCEDURE — 99239 HOSP IP/OBS DSCHRG MGMT >30: CPT

## 2018-09-07 RX ADMIN — Medication 25 MILLIGRAM(S): at 05:37

## 2018-09-07 RX ADMIN — PIPERACILLIN AND TAZOBACTAM 25 GRAM(S): 4; .5 INJECTION, POWDER, LYOPHILIZED, FOR SOLUTION INTRAVENOUS at 05:37

## 2018-09-07 RX ADMIN — SODIUM CHLORIDE 50 MILLILITER(S): 9 INJECTION INTRAMUSCULAR; INTRAVENOUS; SUBCUTANEOUS at 05:38

## 2018-09-07 RX ADMIN — PIPERACILLIN AND TAZOBACTAM 25 GRAM(S): 4; .5 INJECTION, POWDER, LYOPHILIZED, FOR SOLUTION INTRAVENOUS at 15:00

## 2018-09-07 RX ADMIN — ATENOLOL 50 MILLIGRAM(S): 25 TABLET ORAL at 05:38

## 2018-09-07 RX ADMIN — SODIUM CHLORIDE 50 MILLILITER(S): 9 INJECTION INTRAMUSCULAR; INTRAVENOUS; SUBCUTANEOUS at 15:00

## 2018-09-07 NOTE — PROGRESS NOTE ADULT - REASON FOR ADMISSION
called back for GNR bacteremia

## 2018-09-07 NOTE — PROGRESS NOTE ADULT - ASSESSMENT
67 y/o F presented to hospital with fever 8/30, d/kia from ER but called back for positive blood culture.  Blood culture 1/2 with gram variable lucrecia - lab unable to definitely identify but likely Paenibacillus which is generally not a pathogen.  Blood cultures drawn on return to ER 9/3 no growth x 2.    - UA from 8/31 negative  - BCx 8/31- gnr, gram variable rods- await speciation  - Repeat BCx 9/3 neg to date  - CT a/p- dilated CBD, porcelain GB  - MRCP- 1.4 stone in GB neck  - GI and surgery evaluation  appreciated- plan elective logan    Fever probably due to cholecystitis now improved day # 5 zosyn  Blood culture organism probably not pathogenic  ?Gout    Reasonable to d/c zosyn, transition to augmentin 875 mg po BID x 5 days    Return to ER if fever, abd pain    Has my contact info    Danni Johnson MD  Pager: [phone]  After 5 PM or weekends please call fellow on call or office [phone]

## 2018-09-07 NOTE — PROGRESS NOTE ADULT - SUBJECTIVE AND OBJECTIVE BOX
Subjective:    Patient is a 68y old  Female who presents with a chief complaint of called back for GNR bacteremia (06 Sep 2018 18:06)    Overnight events:    MEDICATIONS  (STANDING):  ATENolol  Tablet 50 milliGRAM(s) Oral daily  enoxaparin Injectable 40 milliGRAM(s) SubCutaneous daily  hydrochlorothiazide 25 milliGRAM(s) Oral daily  influenza   Vaccine 0.5 milliLiter(s) IntraMuscular once  piperacillin/tazobactam IVPB. 3.375 Gram(s) IV Intermittent every 8 hours  sodium chloride 0.9%. 1000 milliLiter(s) (50 mL/Hr) IV Continuous <Continuous>    MEDICATIONS  (PRN):  acetaminophen   Tablet .. 650 milliGRAM(s) Oral every 6 hours PRN Moderate Pain (4 - 6)    Objective:    Vitals: Vital Signs Last 24 Hrs  T(C): 36.7 (18 @ 05:33), Max: 37 (18 @ 14:52)  T(F): 98 (18 @ 05:33), Max: 98.6 (18 @ 14:52)  HR: 79 (18 @ 05:33) (70 - 79)  BP: 126/74 (18 @ 05:33) (122/70 - 126/74)  RR: 17 (18 @ 05:33) (16 - 17)  SpO2: 99% (18 @ 05:33) (98% - 99%)              I&O's Summary    05 Sep 2018 07:  -  06 Sep 2018 07:00  --------------------------------------------------------  IN: 500 mL / OUT: 400 mL / NET: 100 mL    06 Sep 2018 07:  -  07 Sep 2018 05:56  --------------------------------------------------------  IN: 936 mL / OUT: 0 mL / NET: 936 mL    PHYSICAL EXAM:  GENERAL: NAD, well-groomed, well-developed  HEAD:  Atraumatic, Normocephalic  EYES: EOMI, PERRLA, conjunctiva and sclera clear  ENMT: No tonsillar erythema, exudates, or enlargement; Moist mucous membranes, Good dentition, No lesions  NECK: Supple, No JVD, Normal thyroid  CHEST/LUNG: Clear to percussion bilaterally; No rales, rhonchi, wheezing, or rubs  HEART: Regular rate and rhythm; No murmurs, rubs, or gallops  ABDOMEN: Soft, Nontender, Nondistended; Bowel sounds present  EXTREMITIES:  2+ Peripheral Pulses, No clubbing, cyanosis, or edema  LYMPH: No lymphadenopathy noted  SKIN: No rashes or lesions  NERVOUS SYSTEM:  Alert & Oriented X3, Good concentration; Motor Strength 5/5 B/L upper and lower extremities; DTRs 2+ intact and symmetric                                             LABS:      138  |  100  |  27<H>  ----------------------------<  146<H>  3.6   |  23  |  1.03      136  |  98  |  18  ----------------------------<  179<H>  4.6   |  25  |  0.93      137  |  95<L>  |  21  ----------------------------<  147<H>  2.9<LL>   |  27  |  0.98    Ca    9.3      06 Sep 2018 06:55  Ca    9.5      05 Sep 2018 08:45  Ca    9.1      04 Sep 2018 07:00  Phos  4.0       Mg     2.5         TPro  7.5  /  Alb  3.7  /  TBili  0.8  /  DBili  x   /  AST  17  /  ALT  16  /  AlkPhos  104      Urinalysis Basic - ( 05 Sep 2018 11:30 )    Color: YELLOW / Appearance: CLEAR / S.034 / pH: 6.5  Gluc: 200 / Ketone: NEGATIVE  / Bili: NEGATIVE / Urobili: NORMAL   Blood: NEGATIVE / Protein: TRACE / Nitrite: NEGATIVE   Leuk Esterase: NEGATIVE / RBC: 0-2 / WBC 0-2   Sq Epi: FEW / Non Sq Epi: x / Bacteria: NEGATIVE               12.3   8.20  )-----------( 364      ( 06 Sep 2018 06:55 )             35.9                         12.6   10.38 )-----------( 353      ( 05 Sep 2018 08:45 )             35.5                         12.5   8.16  )-----------( 369      ( 04 Sep 2018 07:00 )             35.4     CAPILLARY BLOOD GLUCOSE    RECENT CULTURES:   @ 14:12  URINE MIDSTREAM     @ 17:30  BLOOD PERIPHERAL    NO ORGANISMS ISOLATED  NO ORGANISMS ISOLATED AT 72 HRS.   @ 01:56  URINE MIDSTREAM Hossein Vargas PGY-1   [facility] Pager # [phone]  NS Pager # [phone]      Patient is a 68y old  Female who presents with a chief complaint of called back for GNR bacteremia (07 Sep 2018 05:56)      SUBJECTIVE: No acute events overnight. Patient seen and examined at bedside.    REVIEW OF SYSTEMS:  CONSTITUTIONAL: No fever, weight loss, or fatigue  RESPIRATORY: No cough or shortness of breath  CARDIOVASCULAR: No chest pain, palpitations, dizziness, or leg swelling  GASTROINTESTINAL: No abdominal or epigastric pain. No nausea, vomiting, or hematemesis; No diarrhea or constipation. No melena or hematochezia.  GENITOURINARY: No dysuria  NEUROLOGICAL: No headaches  SKIN: No itching or lesions       MEDICATIONS  (STANDING):  ATENolol  Tablet 50 milliGRAM(s) Oral daily  enoxaparin Injectable 40 milliGRAM(s) SubCutaneous daily  hydrochlorothiazide 25 milliGRAM(s) Oral daily  influenza   Vaccine 0.5 milliLiter(s) IntraMuscular once  piperacillin/tazobactam IVPB. 3.375 Gram(s) IV Intermittent every 8 hours  sodium chloride 0.9%. 1000 milliLiter(s) (50 mL/Hr) IV Continuous <Continuous>    MEDICATIONS  (PRN):  acetaminophen   Tablet .. 650 milliGRAM(s) Oral every 6 hours PRN Moderate Pain (4 - 6)        Objective:    Vitals: Vital Signs Last 24 Hrs  T(C): 36.7 (18 @ 05:33), Max: 37 (18 @ 14:52)  T(F): 98 (18 @ 05:33), Max: 98.6 (18 @ 14:52)  HR: 79 (18 @ 05:33) (70 - 79)  BP: 126/74 (18 @ 05:33) (122/70 - 126/74)  BP(mean): --  RR: 17 (18 @ 05:33) (16 - 17)  SpO2: 99% (18 @ 05:33) (98% - 99%)            I&O's Summary    05 Sep 2018 07:01  -  06 Sep 2018 07:00  --------------------------------------------------------  IN: 500 mL / OUT: 400 mL / NET: 100 mL    06 Sep 2018 07:01  -  07 Sep 2018 06:36  --------------------------------------------------------  IN: 936 mL / OUT: 0 mL / NET: 936 mL        PHYSICAL EXAM:  GENERAL: NAD  HEAD:  Atraumatic, Normocephalic  CHEST/LUNG: Clear to auscultation bilaterally; No rales or wheezing  HEART: Regular rate and rhythm; No murmurs, rubs, or gallops  ABDOMEN: Soft, nontender, nondistended  EXTREMITIES:  No clubbing, cyanosis, or edema  LYMPH: No lymphadenopathy noted  SKIN: No rashes or lesions  NERVOUS SYSTEM:  Alert & Oriented X3    LABS:      138  |  100  |  27<H>  ----------------------------<  146<H>  3.6   |  23  |  1.03      136  |  98  |  18  ----------------------------<  179<H>  4.6   |  25  |  0.93      137  |  95<L>  |  21  ----------------------------<  147<H>  2.9<LL>   |  27  |  0.98    Ca    9.3      06 Sep 2018 06:55  Ca    9.5      05 Sep 2018 08:45  Ca    9.1      04 Sep 2018 07:00  Phos  4.0       Mg     2.5         TPro  7.5  /  Alb  3.7  /  TBili  0.8  /  DBili  x   /  AST  17  /  ALT  16  /  AlkPhos  104  09-04                  Urinalysis Basic - ( 05 Sep 2018 11:30 )    Color: YELLOW / Appearance: CLEAR / S.034 / pH: 6.5  Gluc: 200 / Ketone: NEGATIVE  / Bili: NEGATIVE / Urobili: NORMAL   Blood: NEGATIVE / Protein: TRACE / Nitrite: NEGATIVE   Leuk Esterase: NEGATIVE / RBC: 0-2 / WBC 0-2   Sq Epi: FEW / Non Sq Epi: x / Bacteria: NEGATIVE                              12.3   8.20  )-----------( 364      ( 06 Sep 2018 06:55 )             35.9                         12.6   10.38 )-----------( 353      ( 05 Sep 2018 08:45 )             35.5                         12.5   8.16  )-----------( 369      ( 04 Sep 2018 07:00 )             35.4     CAPILLARY BLOOD GLUCOSE        RADIOLOGY:  Imaging Personally Reviewed: YES      Consultants involved in case: YES  Consultant(s) Notes Reviewed:  YES  Care Discussed with Consultants/Other Providers       Hossein Vargas, PGY-1 Hossein Vargas PGY-1   [facility] Pager # [phone]  NS Pager # [phone]      Patient is a 68y old  Female who presents with a chief complaint of called back for GNR bacteremia (07 Sep 2018 05:56)      SUBJECTIVE: No acute events overnight. Patient seen and examined at bedside. No complaints this AM. Patient denies fever, chills, abdominal pain, or dysuria.    REVIEW OF SYSTEMS:  CONSTITUTIONAL: No fever, weight loss, or fatigue  RESPIRATORY: No cough or shortness of breath  CARDIOVASCULAR: No chest pain, palpitations, dizziness, or leg swelling  GASTROINTESTINAL: No abdominal or epigastric pain. No nausea, vomiting, or hematemesis; No diarrhea or constipation. No melena or hematochezia.  GENITOURINARY: No dysuria  NEUROLOGICAL: No headaches  SKIN: No itching or lesions       MEDICATIONS  (STANDING):  ATENolol  Tablet 50 milliGRAM(s) Oral daily  enoxaparin Injectable 40 milliGRAM(s) SubCutaneous daily  hydrochlorothiazide 25 milliGRAM(s) Oral daily  influenza   Vaccine 0.5 milliLiter(s) IntraMuscular once  piperacillin/tazobactam IVPB. 3.375 Gram(s) IV Intermittent every 8 hours  sodium chloride 0.9%. 1000 milliLiter(s) (50 mL/Hr) IV Continuous <Continuous>    MEDICATIONS  (PRN):  acetaminophen   Tablet .. 650 milliGRAM(s) Oral every 6 hours PRN Moderate Pain (4 - 6)        Objective:    Vitals: Vital Signs Last 24 Hrs  T(C): 36.7 (18 @ 05:33), Max: 37 (18 @ 14:52)  T(F): 98 (18 @ 05:33), Max: 98.6 (18 @ 14:52)  HR: 79 (18 @ 05:33) (70 - 79)  BP: 126/74 (18 @ 05:33) (122/70 - 126/74)  BP(mean): --  RR: 17 (18 @ 05:33) (16 - 17)  SpO2: 99% (18 @ 05:33) (98% - 99%)            I&O's Summary    05 Sep 2018 07:01  -  06 Sep 2018 07:00  --------------------------------------------------------  IN: 500 mL / OUT: 400 mL / NET: 100 mL    06 Sep 2018 07:01  -  07 Sep 2018 06:36  --------------------------------------------------------  IN: 936 mL / OUT: 0 mL / NET: 936 mL        PHYSICAL EXAM:  GENERAL: NAD  HEAD:  Atraumatic, Normocephalic  CHEST/LUNG: Clear to auscultation bilaterally; No rales or wheezing  HEART: Regular rate and rhythm; No murmurs, rubs, or gallops  ABDOMEN: Soft, nontender, nondistended  EXTREMITIES:  No clubbing, cyanosis, or edema  LYMPH: No lymphadenopathy noted  SKIN: No rashes or lesions  NERVOUS SYSTEM:  Alert & Oriented X3    LABS:      138  |  100  |  27<H>  ----------------------------<  146<H>  3.6   |  23  |  1.03      136  |  98  |  18  ----------------------------<  179<H>  4.6   |  25  |  0.93      137  |  95<L>  |  21  ----------------------------<  147<H>  2.9<LL>   |  27  |  0.98    Ca    9.3      06 Sep 2018 06:55  Ca    9.5      05 Sep 2018 08:45  Ca    9.1      04 Sep 2018 07:00  Phos  4.0       Mg     2.5         TPro  7.5  /  Alb  3.7  /  TBili  0.8  /  DBili  x   /  AST  17  /  ALT  16  /  AlkPhos  104  04                  Urinalysis Basic - ( 05 Sep 2018 11:30 )    Color: YELLOW / Appearance: CLEAR / S.034 / pH: 6.5  Gluc: 200 / Ketone: NEGATIVE  / Bili: NEGATIVE / Urobili: NORMAL   Blood: NEGATIVE / Protein: TRACE / Nitrite: NEGATIVE   Leuk Esterase: NEGATIVE / RBC: 0-2 / WBC 0-2   Sq Epi: FEW / Non Sq Epi: x / Bacteria: NEGATIVE                              12.3   8.20  )-----------( 364      ( 06 Sep 2018 06:55 )             35.9                         12.6   10.38 )-----------( 353      ( 05 Sep 2018 08:45 )             35.5                         12.5   8.16  )-----------( 369      ( 04 Sep 2018 07:00 )             35.4     CAPILLARY BLOOD GLUCOSE        RADIOLOGY:  Imaging Personally Reviewed: YES      Consultants involved in case: YES  Consultant(s) Notes Reviewed:  YES  Care Discussed with Consultants/Other Providers       Hossein Vargas, PGY-1

## 2018-09-07 NOTE — PROGRESS NOTE ADULT - SUBJECTIVE AND OBJECTIVE BOX
Follow Up:  Feels well except for right toe pain ?gout. Pt wants to go home. No abd pain, n/v. Eating/drinking well.       ROS:    All other systems negative    Constitutional: no fever, no chills  Head: no trauma  Eyes: no vision changes, no eye pain  ENT:  no sore throat, no rhinorrhea  Cardiovascular:  no chest pain, no palpitation  Respiratory:  no SOB, no cough  GI:  no abd pain, no vomiting, no diarrhea  urinary: no dysuria, no hematuria, no flank pain  musculoskeletal:  no joint pain, no joint swelling  skin:  no rash  neurology:  no headache, no seizure, no change in mental status  psych: no anxiety, no depression         Allergies  No Known Allergies        ANTIMICROBIALS:  piperacillin/tazobactam IVPB. 3.375 every 8 hours      OTHER MEDS:  acetaminophen   Tablet .. 650 milliGRAM(s) Oral every 6 hours PRN  ATENolol  Tablet 50 milliGRAM(s) Oral daily  enoxaparin Injectable 40 milliGRAM(s) SubCutaneous daily  hydrochlorothiazide 25 milliGRAM(s) Oral daily  influenza   Vaccine 0.5 milliLiter(s) IntraMuscular once  sodium chloride 0.9%. 1000 milliLiter(s) IV Continuous <Continuous>      Vital Signs Last 24 Hrs  T(F): 98.1 (09-07-18 @ 13:37), Max: 98.6 (09-06-18 @ 14:52)  HR: 69 (09-07-18 @ 13:37)  BP: 139/58 (09-07-18 @ 13:37)  RR: 17 (09-07-18 @ 13:37)  SpO2: 98% (09-07-18 @ 13:37) (98% - 99%)    Physical Exam:  General:    NAD,  non toxic, A&O x 3  Head: atraumatic, normocephalic  Eye: normal sclera and conjunctiva  ENT:    no oropharyngeal lesions,   no LAD,   neck supple  Cardio:     regular S1, S2  Respiratory:    clear b/l,    no wheezing  abd:     soft,   BS +,   no tenderness,    no organomegaly  :   no CVAT,  no suprapubic tenderness,   no  friedman  Musculoskeletal:   right hallux tender, swollen                        11.8   9.86  )-----------( 376      ( 07 Sep 2018 06:05 )             34.0 09-07    136  |  99  |  17  ----------------------------<  128  3.9   |  23  |  0.83  Ca    9.2      07 Sep 2018 06:05Phos  3.6     09-07Mg     2.3     09-07  TPro  7.5  /  Alb  3.7  /  TBili  0.6  /  DBili  x   /  AST  16  /  ALT  13  /  AlkPhos  95  09-07vascular: no lines, normal pulses  Skin:    no rash  Neurologic:     no focal deficit  psych: normal affect, no suicidal ideation                             MICROBIOLOGY:  v  URINE MIDSTREAM  09-05-18 neg       BLOOD PERIPHERAL  09-03-18 neg x 2      BLOOD PERIPHERAL  08-31-18 --  --  BLOOD CULTURE PCR  GRAM VARIABLE RODS    RADIOLOGY:    MRCP- 1.4 cm stone in the gallbladder neck. Porcelain gallbladder.    Borderline dilated intra and extrahepatic biliary system of indeterminate   significance. No choledocholithiasis. No evidence for neoplasm.    CXR- Clear lungs.(8/31)    CT a/p-Dilated CBD and mild intrahepatic biliary dilatation; suggest MRI and/or   GI consultation.Porcelain gallbladder.

## 2018-09-07 NOTE — PROGRESS NOTE ADULT - ATTENDING COMMENTS
68F p/w flank pain and several weeks of fevers 8/31 in ED, called in for positive blood culture  --f/u ID of bacteria in blood, continue zosyn for now.  f/u ID recs  --MRCP probably passed CBD stone, no GI intervention planned  --appreciate surgery recs, for elective lap logan for porcelain GB after discharge 68F p/w flank pain and several weeks of fevers 8/31 in ED, called in for positive blood culture  --appreciate ID recs; stable to d/c pt on augmentin x 5 days  --MRCP probably passed CBD stone, no GI intervention planned  --appreciate surgery recs, for elective lap logan for porcelain GB after discharge  d/c time 45 min

## 2018-09-07 NOTE — PROGRESS NOTE ADULT - PROBLEM SELECTOR PLAN 1
Pt called back b/c of GNR bacteremia after being discharged from the ED (now changed to GVR); pt is afebrile, w/o leukocytosis or overt physical exam findings suggestive of source  -BCx 9/3: NGTD  -CT A/P showed dilated CBD and porcelain gallbladder  -MRCP showed cholelithiasis w/o choledocholithiasis  -GI: no endoscopic interventions since no active CBD obstruction  -c/w Zosyn (day 4) until repeat cultures are cleared  -TTE ordered to r/o endocarditis Pt called back b/c of GNR bacteremia after being discharged from the ED (now changed to GVR); pt is afebrile, w/o leukocytosis or overt physical exam findings suggestive of source  -BCx 9/3: NGTD  -CT A/P showed dilated CBD and porcelain gallbladder  -MRCP showed cholelithiasis w/o choledocholithiasis  -GI: no endoscopic interventions since no active CBD obstruction  -c/w Zosyn (day 5) until repeat cultures are cleared  -TTE could not r/o endocarditis; proceed to [facility]OLDO Pt called back b/c of GNR bacteremia after being discharged from the ED (now changed to GVR); pt is afebrile, w/o leukocytosis or overt physical exam findings suggestive of source  -final speciation for GVR pending  -BCx 9/3: negative  -CT A/P showed dilated CBD and porcelain gallbladder  -MRCP showed cholelithiasis w/o choledocholithiasis  -GI: no endoscopic interventions since no active CBD obstruction  -c/w Zosyn (day 5)  -TTE could not r/o endocarditis; will consider proceeding to ARNOLDO

## 2018-09-07 NOTE — PROGRESS NOTE ADULT - PROBLEM SELECTOR PLAN 5
DVT: Lovenox  Dispo: floor  Consult: none  Code: full

## 2018-09-07 NOTE — PROGRESS NOTE ADULT - ASSESSMENT
67 y/o F PMH HTN returning to ED today after being found to have GNR bacteremia on previous BCx drawn 2 days ago in the setting of 19 days of fever of unclear etiology. Possible sources of infection include GI vs UTI. CT A/P showing dilated CBD and porcelain gallbladder. MRCP showing cholelithiasis w/o choledocholithiasis. GI, Surg consulted. 69 y/o F PMH HTN returning to ED today after being found to have GNR bacteremia on previous BCx drawn 2 days ago in the setting of 19 days of fever of unclear etiology. Possible sources of infection include GI vs UTI. CT A/P showing dilated CBD and porcelain gallbladder. MRCP showing cholelithiasis w/o choledocholithiasis. 67 y/o F PMH HTN returning to ED today after being found to have GNR bacteremia on previous BCx drawn 2 days ago in the setting of 19 days of fever of unclear etiology. Possible sources of infection include GI vs UTI. CT A/P showing dilated CBD and porcelain gallbladder. MRCP showing cholelithiasis w/o choledocholithiasis. Pending speciation of GVR on initial BCx.

## 2018-09-07 NOTE — PROGRESS NOTE ADULT - SUBJECTIVE AND OBJECTIVE BOX
Surgical Oncology/[facility]B.    Patient comfortable, denies abdominal pain.  Reports history of RUQ abdominal pain.  Imaging reviewed.  Recommend minimally invasive cholecystectomy for porcelain GB, possible liver resection/portal lymphadenectomy if malignancy confirmed intra-operatively.  May schedule as elective procedure later this month.  Office information provided to patient and will also have office reach out to her for appointment after discharge.    Thank you for the courtesy of this consultation.    Trev Wayne.

## 2018-09-08 LAB
BACTERIA BLD CULT: SIGNIFICANT CHANGE UP
BACTERIA BLD CULT: SIGNIFICANT CHANGE UP

## 2018-09-10 PROBLEM — I10 ESSENTIAL (PRIMARY) HYPERTENSION: Chronic | Status: ACTIVE | Noted: 2018-09-03

## 2018-09-13 ENCOUNTER — APPOINTMENT (OUTPATIENT)
Dept: SURGICAL ONCOLOGY | Facility: CLINIC | Age: 68
End: 2018-09-13
Payer: MEDICARE

## 2018-09-13 VITALS
DIASTOLIC BLOOD PRESSURE: 88 MMHG | BODY MASS INDEX: 27.94 KG/M2 | OXYGEN SATURATION: 97 % | SYSTOLIC BLOOD PRESSURE: 153 MMHG | HEIGHT: 61 IN | WEIGHT: 148 LBS | HEART RATE: 72 BPM

## 2018-09-13 DIAGNOSIS — F17.200 NICOTINE DEPENDENCE, UNSPECIFIED, UNCOMPLICATED: ICD-10-CM

## 2018-09-13 DIAGNOSIS — F17.210 NICOTINE DEPENDENCE, CIGARETTES, UNCOMPLICATED: ICD-10-CM

## 2018-09-13 DIAGNOSIS — Z78.9 OTHER SPECIFIED HEALTH STATUS: ICD-10-CM

## 2018-09-13 DIAGNOSIS — Z86.79 PERSONAL HISTORY OF OTHER DISEASES OF THE CIRCULATORY SYSTEM: ICD-10-CM

## 2018-09-13 DIAGNOSIS — Z87.39 PERSONAL HISTORY OF OTHER DISEASES OF THE MUSCULOSKELETAL SYSTEM AND CONNECTIVE TISSUE: ICD-10-CM

## 2018-09-13 PROCEDURE — 99204 OFFICE O/P NEW MOD 45 MIN: CPT

## 2018-09-18 PROBLEM — Z78.9 CONSUMES ALCOHOL OCCASIONALLY: Status: ACTIVE | Noted: 2018-09-13

## 2018-09-18 PROBLEM — Z87.39 HISTORY OF ARTHRITIS: Status: RESOLVED | Noted: 2018-09-13 | Resolved: 2018-09-18

## 2018-09-18 PROBLEM — Z78.9 CAFFEINE USE: Status: ACTIVE | Noted: 2018-06-07

## 2018-09-18 PROBLEM — F17.210 SMOKES CIGARETTES: Status: ACTIVE | Noted: 2018-09-13

## 2018-09-18 PROBLEM — F17.200 CURRENT EVERY DAY SMOKER: Status: ACTIVE | Noted: 2018-06-07

## 2018-09-18 PROBLEM — Z86.79 HISTORY OF HYPERTENSION: Status: RESOLVED | Noted: 2018-09-13 | Resolved: 2018-09-18

## 2018-09-18 RX ORDER — ATENOLOL 50 MG/1
TABLET ORAL
Refills: 0 | Status: ACTIVE | COMMUNITY

## 2018-09-18 RX ORDER — CALCIUM CARBONATE/VITAMIN D3 600 MG-10
TABLET ORAL
Refills: 0 | Status: ACTIVE | COMMUNITY

## 2018-09-18 RX ORDER — AMOXICILLIN 875 MG/1
875 TABLET, FILM COATED ORAL
Refills: 0 | Status: ACTIVE | COMMUNITY

## 2018-09-21 NOTE — PATIENT PROFILE ADULT. - FUNCTIONAL SCREEN CURRENT LEVEL: DRESSING, MLM
[facility]  Psychiatric Progress Note      Impression:   Maggie Case is a 29 year old who presented via EMS from her nursing home facility. Patient had seen her PCP, Dr. Flores, on 9/19/18 and endorsed suicidal ideation with plan to tie headphones around her neck. She was noted to have hypokalemia and acute cystitis without hematuria and was treated.     Today patient is interviewed in room. She is tired and falls asleep during interview, requiring awakening.  She does not endorse active suicidal ideation today. Sedation may be due to increased gabapentin, so will return to previous dose. Has refused blood draw for K+ check three times today. Emphasized need to obtain potassium lab.  Review of chart notes patient is allowing staff to do things for her that she was independent with prior to hospitalization.  Discussed encouraging her to attend groups.  Does not report neuropathic pain today. Will continue increased Cymbalta at this time.  Patient has good appetite and is hypersomnolent today.    Educated regarding medication indications, risks, benefits, side effects, contraindications and possible interactions. Verbally expressed understanding.        Diagnoses:   Persistent Depressive Disorder  Anxiety Disorder Unspecified  H/O Substance Use Disorder  H/O OCD  H/O Stroke    Attestation:  Patient has been seen and evaluated by me,  WOO Jerome CNP          Interim History:   The patient's care was discussed with the treatment team and chart notes were reviewed.          Medications:     Current Facility-Administered Medications   Medication     acetaminophen (TYLENOL) tablet 650 mg     alum & mag hydroxide-simethicone (MYLANTA ES/MAALOX  ES) suspension 30 mL     bisacodyl (DULCOLAX) Suppository 10 mg     cyanocobalamin (vitamin  B-12) tablet 1,000 mcg     DULoxetine (CYMBALTA) EC capsule 30 mg     DULoxetine (CYMBALTA) EC capsule 60 mg     ferrous sulfate (IRON) tablet 325 mg     gabapentin  "(NEURONTIN) capsule 300 mg     gabapentin (NEURONTIN) tablet 600 mg     hydrOXYzine (ATARAX) tablet 25-50 mg     loperamide (IMODIUM) capsule 2 mg     magnesium hydroxide (MILK OF MAGNESIA) suspension 30 mL     melatonin tablet 5 mg     nicotine (NICODERM CQ) 14 MG/24HR 24 hr patch 1 patch     nicotine Patch in Place     nicotine patch REMOVAL     nitroFURantoin (MACRODANTIN) capsule 100 mg     nystatin (MYCOSTATIN) suspension 500,000 Units     OLANZapine (zyPREXA) tablet 10 mg    Or     OLANZapine (zyPREXA) injection 10 mg     ondansetron (ZOFRAN-ODT) ODT tab 4 mg     rivaroxaban ANTICOAGULANT (XARELTO) tablet 20 mg     traZODone (DESYREL) tablet 100 mg     trolamine salicylate (ASPERCREME) 10 % cream 1 g      10 point ROS positive for peripheral neuropathy and left sided paralysis       Allergies:     Allergies   Allergen Reactions     Amoxicillin Other (See Comments)     Headaches     Levaquin [Levofloxacin] Swelling     Naproxen Other (See Comments)     Reaction: Headaches     Nickel      Tramadol      Sulindac Rash          Psychiatric Examination:   /78  Pulse 88  Temp 98.5  F (36.9  C) (Tympanic)  Resp 16  Ht 1.6 m (5' 3\")  Wt 56.2 kg (124 lb)  SpO2 98%  BMI 21.97 kg/m2  Weight is 124 lbs 0 oz  Body mass index is 21.97 kg/(m^2).    Appearance: sedated, tired  Attitude: partially cooperative  Eye Contact:  fair  Mood:  sad  and depressed  Affect:  mood congruent and intensity is heightened  Speech:  clear, coherent  Psychomotor Behavior:  no evidence of tardive dyskinesia, dystonia, or tics  Thought Process:  goal oriented  Associations:  no loose associations  Thought Content: passive suicidal ideation present  Insight:  fair  Judgment:  fair  Oriented to:  time, person, and place  Attention Span and Concentration:  intact  Recent and Remote Memory:  intact  Fund of Knowledge: appropriate  Muscle Strength and Tone: left sided weakness from stroke  Gait and Station: abnormal. utilizes wheelchair " due to weakness from stroke.         Labs:   No results found for this or any previous visit (from the past 24 hour(s)).         Plan:   Continue Inpatient Hospitalization  Continue to provide a safe environment and therapeutic milieu.  Decrease Gabapentin back to previous dose due to increased sedation.  Monitor K+, has refused labs x 3 today. Talked with patient about need to have draw.  Encourage groups  Encourage patient to continue independent activities as much as possible.    ELOS: 2-3 days for medication changes and decrease of suicidal ideation.   (0) independent

## 2018-09-23 RX ORDER — INDOMETHACIN 50 MG
75 CAPSULE ORAL
Qty: 0 | Refills: 0 | COMMUNITY
Start: 2018-09-23

## 2018-09-24 ENCOUNTER — OUTPATIENT (OUTPATIENT)
Dept: OUTPATIENT SERVICES | Facility: HOSPITAL | Age: 68
LOS: 1 days | End: 2018-09-24
Payer: MEDICARE

## 2018-09-24 VITALS
TEMPERATURE: 98 F | HEART RATE: 83 BPM | SYSTOLIC BLOOD PRESSURE: 140 MMHG | RESPIRATION RATE: 16 BRPM | WEIGHT: 145.95 LBS | DIASTOLIC BLOOD PRESSURE: 80 MMHG | HEIGHT: 61 IN

## 2018-09-24 DIAGNOSIS — I10 ESSENTIAL (PRIMARY) HYPERTENSION: ICD-10-CM

## 2018-09-24 DIAGNOSIS — K82.9 DISEASE OF GALLBLADDER, UNSPECIFIED: ICD-10-CM

## 2018-09-24 DIAGNOSIS — K81.9 CHOLECYSTITIS, UNSPECIFIED: ICD-10-CM

## 2018-09-24 LAB
BLD GP AB SCN SERPL QL: NEGATIVE — SIGNIFICANT CHANGE UP
RH IG SCN BLD-IMP: POSITIVE — SIGNIFICANT CHANGE UP

## 2018-09-24 PROCEDURE — 93010 ELECTROCARDIOGRAM REPORT: CPT

## 2018-09-24 RX ORDER — SODIUM CHLORIDE 9 MG/ML
1000 INJECTION, SOLUTION INTRAVENOUS
Qty: 0 | Refills: 0 | Status: DISCONTINUED | OUTPATIENT
Start: 2018-10-05 | End: 2018-10-05

## 2018-09-24 RX ORDER — ATENOLOL AND CHLORTHALIDONE 50; 25 MG/1; MG/1
1 TABLET ORAL
Qty: 0 | Refills: 0 | COMMUNITY

## 2018-09-24 NOTE — H&P PST ADULT - PROBLEM SELECTOR PLAN 2
Abnormal EKG in PST, requesting comparison from PCP.  Pt. instructed to take anti-hypertensive DOS with small sip of water (Atenolol)

## 2018-09-24 NOTE — H&P PST ADULT - PROBLEM SELECTOR PLAN 1
Scheduled for robotic cholecystectomy, liver resection on 10/05/2018.   Pre-Op instructions provided to patient.   Pepcid for GI prophylaxis provided.   Surgical scrub instructions reviewed and provided to patient.   Patient will obtain medical clearance as per surgeons request.

## 2018-09-24 NOTE — H&P PST ADULT - HISTORY OF PRESENT ILLNESS
67 yo female presents to PST unit with diagnosis of cholecystitis scheduled for robotic cholecystectomy, liver resection on 10/05/2018. She reports recent hospitalization for fever of unknown origin, s/p workup for bacteremia.

## 2018-09-24 NOTE — H&P PST ADULT - NSANTHOSAYNRD_GEN_A_CORE
No. CIARA screening performed.  STOP BANG Legend: 0-2 = LOW Risk; 3-4 = INTERMEDIATE Risk; 5-8 = HIGH Risk

## 2018-09-24 NOTE — H&P PST ADULT - RS GEN PE MLT RESP DETAILS PC
respirations non-labored/clear to auscultation bilaterally/no wheezes/good air movement/breath sounds equal/airway patent

## 2018-09-24 NOTE — H&P PST ADULT - NEGATIVE ENMT SYMPTOMS
no hearing difficulty/no ear pain/no vertigo/no nasal congestion/no tinnitus/no sinus symptoms/no dysphagia

## 2018-09-24 NOTE — H&P PST ADULT - MUSCULOSKELETAL
details… detailed exam no joint warmth/normal strength/no calf tenderness/no joint swelling/no joint erythema/ROM intact

## 2018-09-24 NOTE — H&P PST ADULT - GASTROINTESTINAL COMMENTS
Right lower quadrant abdominal pain that radiates to back. Pre-op diagnosis cholecystitis Pre-op diagnosis of cholecystitis

## 2018-09-28 LAB
-  CANDIDA ALBICANS: SIGNIFICANT CHANGE UP
-  CANDIDA GLABRATA: SIGNIFICANT CHANGE UP
-  CANDIDA KRUSEI: SIGNIFICANT CHANGE UP
-  CANDIDA PARAPSILOSIS: SIGNIFICANT CHANGE UP
-  CANDIDA TROPICALIS: SIGNIFICANT CHANGE UP
-  COAGULASE NEGATIVE STAPHYLOCOCCUS: SIGNIFICANT CHANGE UP
-  K. PNEUMONIAE GROUP: SIGNIFICANT CHANGE UP
-  KPC RESISTANCE GENE: SIGNIFICANT CHANGE UP
-  STREPTOCOCCUS SP. (NOT GRP A, B OR S PNEUMONIAE): SIGNIFICANT CHANGE UP
A BAUMANNII DNA SPEC QL NAA+PROBE: SIGNIFICANT CHANGE UP
BACTERIA BLD CULT: SIGNIFICANT CHANGE UP
E CLOAC COMP DNA BLD POS QL NAA+PROBE: SIGNIFICANT CHANGE UP
E COLI DNA BLD POS QL NAA+NON-PROBE: SIGNIFICANT CHANGE UP
ENTEROCOC DNA BLD POS QL NAA+NON-PROBE: SIGNIFICANT CHANGE UP
ENTEROCOC DNA BLD POS QL NAA+NON-PROBE: SIGNIFICANT CHANGE UP
GP B STREP DNA BLD POS QL NAA+NON-PROBE: SIGNIFICANT CHANGE UP
HAEM INFLU DNA BLD POS QL NAA+NON-PROBE: SIGNIFICANT CHANGE UP
K OXYTOCA DNA BLD POS QL NAA+NON-PROBE: SIGNIFICANT CHANGE UP
L MONOCYTOG DNA BLD POS QL NAA+NON-PROBE: SIGNIFICANT CHANGE UP
MRSA SPEC QL CULT: SIGNIFICANT CHANGE UP
MSSA DNA SPEC QL NAA+PROBE: SIGNIFICANT CHANGE UP
N MEN ISLT CULT: SIGNIFICANT CHANGE UP
P AERUGINOSA DNA BLD POS NAA+NON-PROBE: SIGNIFICANT CHANGE UP
S MARCESCENS DNA BLD POS NAA+NON-PROBE: SIGNIFICANT CHANGE UP
S PNEUM DNA BLD POS QL NAA+NON-PROBE: SIGNIFICANT CHANGE UP
S PYO DNA BLD POS QL NAA+NON-PROBE: SIGNIFICANT CHANGE UP

## 2018-10-04 ENCOUNTER — TRANSCRIPTION ENCOUNTER (OUTPATIENT)
Age: 68
End: 2018-10-04

## 2018-10-05 ENCOUNTER — RESULT REVIEW (OUTPATIENT)
Age: 68
End: 2018-10-05

## 2018-10-05 ENCOUNTER — INPATIENT (INPATIENT)
Facility: HOSPITAL | Age: 68
LOS: 0 days | Discharge: HOME | End: 2018-10-05
Attending: SURGERY | Admitting: SURGERY
Payer: MEDICARE

## 2018-10-05 ENCOUNTER — APPOINTMENT (OUTPATIENT)
Dept: SURGICAL ONCOLOGY | Facility: HOSPITAL | Age: 68
End: 2018-10-05

## 2018-10-05 VITALS
TEMPERATURE: 98 F | SYSTOLIC BLOOD PRESSURE: 133 MMHG | HEART RATE: 66 BPM | HEIGHT: 61 IN | WEIGHT: 145.95 LBS | DIASTOLIC BLOOD PRESSURE: 73 MMHG | OXYGEN SATURATION: 97 % | RESPIRATION RATE: 16 BRPM

## 2018-10-05 VITALS
DIASTOLIC BLOOD PRESSURE: 77 MMHG | RESPIRATION RATE: 16 BRPM | SYSTOLIC BLOOD PRESSURE: 154 MMHG | HEART RATE: 68 BPM | OXYGEN SATURATION: 98 %

## 2018-10-05 DIAGNOSIS — K82.9 DISEASE OF GALLBLADDER, UNSPECIFIED: ICD-10-CM

## 2018-10-05 LAB
BASE EXCESS BLDA CALC-SCNC: 0.2 MMOL/L — SIGNIFICANT CHANGE UP
CA-I BLDA-SCNC: 1.12 MMOL/L — LOW (ref 1.15–1.29)
GLUCOSE BLDA-MCNC: 173 MG/DL — HIGH (ref 70–99)
HCO3 BLDA-SCNC: 25 MMOL/L — SIGNIFICANT CHANGE UP (ref 22–26)
HCT VFR BLDA CALC: 34.6 % — SIGNIFICANT CHANGE UP (ref 34.5–46.5)
HGB BLDA-MCNC: 11.2 G/DL — LOW (ref 11.5–15.5)
PCO2 BLDA: 37 MMHG — SIGNIFICANT CHANGE UP (ref 32–48)
PH BLDA: 7.43 PH — SIGNIFICANT CHANGE UP (ref 7.35–7.45)
PO2 BLDA: 185 MMHG — HIGH (ref 83–108)
POTASSIUM BLDA-SCNC: 3.5 MMOL/L — SIGNIFICANT CHANGE UP (ref 3.4–4.5)
RH IG SCN BLD-IMP: POSITIVE — SIGNIFICANT CHANGE UP
SAO2 % BLDA: 99.5 % — HIGH (ref 95–99)
SODIUM BLDA-SCNC: 137 MMOL/L — SIGNIFICANT CHANGE UP (ref 136–146)

## 2018-10-05 PROCEDURE — 49561: CPT | Mod: 59

## 2018-10-05 PROCEDURE — 88304 TISSUE EXAM BY PATHOLOGIST: CPT | Mod: 26

## 2018-10-05 PROCEDURE — 47600 CHOLECYSTECTOMY: CPT

## 2018-10-05 PROCEDURE — 88331 PATH CONSLTJ SURG 1 BLK 1SPC: CPT | Mod: 26

## 2018-10-05 PROCEDURE — 49585: CPT | Mod: 59

## 2018-10-05 PROCEDURE — 88302 TISSUE EXAM BY PATHOLOGIST: CPT | Mod: 26

## 2018-10-05 PROCEDURE — S2900 ROBOTIC SURGICAL SYSTEM: CPT | Mod: NC

## 2018-10-05 RX ORDER — ATENOLOL AND CHLORTHALIDONE 50; 25 MG/1; MG/1
1 TABLET ORAL
Qty: 0 | Refills: 0 | COMMUNITY

## 2018-10-05 RX ORDER — ATENOLOL 25 MG/1
50 TABLET ORAL DAILY
Qty: 0 | Refills: 0 | Status: DISCONTINUED | OUTPATIENT
Start: 2018-10-05 | End: 2018-10-05

## 2018-10-05 RX ORDER — HYDROMORPHONE HYDROCHLORIDE 2 MG/ML
0.5 INJECTION INTRAMUSCULAR; INTRAVENOUS; SUBCUTANEOUS
Qty: 0 | Refills: 0 | Status: DISCONTINUED | OUTPATIENT
Start: 2018-10-05 | End: 2018-10-05

## 2018-10-05 RX ORDER — SODIUM CHLORIDE 9 MG/ML
1000 INJECTION, SOLUTION INTRAVENOUS
Qty: 0 | Refills: 0 | Status: DISCONTINUED | OUTPATIENT
Start: 2018-10-05 | End: 2018-10-05

## 2018-10-05 RX ORDER — OXYCODONE AND ACETAMINOPHEN 5; 325 MG/1; MG/1
1 TABLET ORAL EVERY 4 HOURS
Qty: 0 | Refills: 0 | Status: DISCONTINUED | OUTPATIENT
Start: 2018-10-05 | End: 2018-10-05

## 2018-10-05 RX ORDER — OXYCODONE AND ACETAMINOPHEN 5; 325 MG/1; MG/1
2 TABLET ORAL EVERY 4 HOURS
Qty: 0 | Refills: 0 | Status: DISCONTINUED | OUTPATIENT
Start: 2018-10-05 | End: 2018-10-05

## 2018-10-05 RX ORDER — ONDANSETRON 8 MG/1
4 TABLET, FILM COATED ORAL ONCE
Qty: 0 | Refills: 0 | Status: DISCONTINUED | OUTPATIENT
Start: 2018-10-05 | End: 2018-10-05

## 2018-10-05 RX ORDER — FENTANYL CITRATE 50 UG/ML
25 INJECTION INTRAVENOUS
Qty: 0 | Refills: 0 | Status: DISCONTINUED | OUTPATIENT
Start: 2018-10-05 | End: 2018-10-05

## 2018-10-05 RX ADMIN — SODIUM CHLORIDE 100 MILLILITER(S): 9 INJECTION, SOLUTION INTRAVENOUS at 11:27

## 2018-10-05 RX ADMIN — SODIUM CHLORIDE 30 MILLILITER(S): 9 INJECTION, SOLUTION INTRAVENOUS at 06:32

## 2018-10-05 NOTE — BRIEF OPERATIVE NOTE - OPERATION/FINDINGS
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 Primary repair of umbilical & epigastric hernias. (separate repairs)

## 2018-10-05 NOTE — BRIEF OPERATIVE NOTE - PROCEDURE
<<-----Click on this checkbox to enter Procedure Laparoscopic cholecystectomy  10/05/2018    Active  AGAINES

## 2018-10-05 NOTE — ASU DISCHARGE PLAN (ADULT/PEDIATRIC). - MEDICATION SUMMARY - MEDICATIONS TO STOP TAKING
I will STOP taking the medications listed below when I get home from the hospital:    indomethacin  -- 75 milligram(s) by mouth 2 times a day, completed 09/23    amoxicillin-clavulanate 875 mg-125 mg oral tablet  -- 1 tab(s) by mouth every 12 hours, Last dose09/15

## 2018-10-05 NOTE — ASU DISCHARGE PLAN (ADULT/PEDIATRIC). - COMMENTS
call  PAC[facility]  [phone] ( open  24 hour  and weekend ) , [phone] ( open 6AM to 11 PM  closed weekend)

## 2018-10-05 NOTE — ASU DISCHARGE PLAN (ADULT/PEDIATRIC). - NOTIFY
Increased Irritability or Sluggishness/Pain not relieved by Medications/Persistent Nausea and Vomiting/Bleeding that does not stop/Numbness, color, or temperature change to extremity/Fever greater than 101/Inability to Tolerate Liquids or Foods

## 2018-10-05 NOTE — ASU DISCHARGE PLAN (ADULT/PEDIATRIC). - MEDICATION SUMMARY - MEDICATIONS TO TAKE
I will START or STAY ON the medications listed below when I get home from the hospital:    oxyCODONE-acetaminophen 5 mg-325 mg oral tablet  -- 1 tab(s) by mouth every 4 hours, As needed, Moderate to severe pain MDD:6  -- Indication: For pain control    atenolol-chlorthalidone 50 mg-25 mg oral tablet  -- 1 tab(s) by mouth once a day AM  -- Indication: For htn

## 2018-10-09 PROBLEM — K81.9 CHOLECYSTITIS, UNSPECIFIED: Chronic | Status: ACTIVE | Noted: 2018-09-24

## 2018-10-09 LAB — SURGICAL PATHOLOGY STUDY: SIGNIFICANT CHANGE UP

## 2018-10-16 ENCOUNTER — APPOINTMENT (OUTPATIENT)
Dept: SURGICAL ONCOLOGY | Facility: CLINIC | Age: 68
End: 2018-10-16
Payer: MEDICARE

## 2018-10-16 VITALS
DIASTOLIC BLOOD PRESSURE: 79 MMHG | SYSTOLIC BLOOD PRESSURE: 137 MMHG | HEIGHT: 61 IN | HEART RATE: 75 BPM | OXYGEN SATURATION: 97 % | WEIGHT: 150 LBS | BODY MASS INDEX: 28.32 KG/M2

## 2018-10-16 DIAGNOSIS — K82.8 OTHER SPECIFIED DISEASES OF GALLBLADDER: ICD-10-CM

## 2018-10-16 PROCEDURE — 99024 POSTOP FOLLOW-UP VISIT: CPT

## 2018-12-12 NOTE — ED ADULT TRIAGE NOTE - ESI TRIAGE ACUITY LEVEL, MLM
Telephone Encounter by Verenice Moreland at 04/28/17 08:37 AM     Author:  Verenice Moreland Service:  (none) Author Type:       Filed:  04/28/17 08:38 AM Encounter Date:  4/28/2017 Status:  Signed     :  Verenice Moreland ()            Left first message on v/m to schedule NHM with [facility] for hem.[PK1.1M]      Revision History        User Key Date/Time User Provider Type Action    > PK1.1 04/28/17 08:38 AM Verenice Moreland  Sign    M - Manual             3

## 2019-05-17 ENCOUNTER — TRANSCRIPTION ENCOUNTER (OUTPATIENT)
Age: 69
End: 2019-05-17

## 2021-04-12 NOTE — PROGRESS NOTE ADULT - PROBLEM SELECTOR PLAN 1
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  Has form  Has GYN for well woman exam           Review of Systems   Constitutional: Positive for fatigue  Negative for chills, diaphoresis and fever  HENT: Negative for congestion, ear pain, rhinorrhea and sore throat  Eyes: Negative for pain, discharge and visual disturbance  Wears eye glasses- last exam over 1 year ago  Respiratory: Negative for cough, chest tightness and shortness of breath  Gastrointestinal: Negative for abdominal pain, constipation, diarrhea, nausea and vomiting  Endocrine: Negative for polydipsia, polyphagia and polyuria  Genitourinary: Positive for menstrual problem  Negative for dysuria, frequency and vaginal discharge  Has IUD, and irregular menses as a result  Musculoskeletal: Positive for arthralgias  Negative for back pain, myalgias, neck pain and neck stiffness  Neurological: Negative for dizziness, tremors, syncope, weakness, light-headedness, numbness and headaches  Psychiatric/Behavioral: Negative for decreased concentration, dysphoric mood, self-injury, sleep disturbance and suicidal ideas  The patient is not nervous/anxious  Objective:      BP 98/66   Pulse 61   Ht 5' 4" (1 626 m)   Wt 86 6 kg (191 lb)   SpO2 98%   BMI 32 79 kg/m²          Physical Exam  Vitals signs and nursing note reviewed  Constitutional:       General: She is not in acute distress  Appearance: Normal appearance  She is not ill-appearing, toxic-appearing or diaphoretic  HENT:      Head: Normocephalic and atraumatic  Right Ear: Tympanic membrane, ear canal and external ear normal  There is no impacted cerumen        Left Ear: Tympanic membrane, ear canal and external ear normal  There is no impacted cerumen  Nose: Nose normal  No congestion or rhinorrhea  Mouth/Throat:      Mouth: Mucous membranes are moist    Eyes:      General: No scleral icterus  Right eye: No discharge  Left eye: No discharge  Extraocular Movements: Extraocular movements intact  Conjunctiva/sclera: Conjunctivae normal    Cardiovascular:      Rate and Rhythm: Normal rate and regular rhythm  Pulses: Normal pulses  Heart sounds: Normal heart sounds  No murmur  No friction rub  No gallop  Pulmonary:      Effort: Pulmonary effort is normal  No respiratory distress  Breath sounds: Normal breath sounds  No stridor  No wheezing, rhonchi or rales  Chest:      Chest wall: No tenderness  Abdominal:      General: There is no distension  Palpations: Abdomen is soft  Tenderness: There is no abdominal tenderness  There is no right CVA tenderness, left CVA tenderness, guarding or rebound  Hernia: No hernia is present  Musculoskeletal: Normal range of motion  General: No swelling, tenderness, deformity or signs of injury  Right lower leg: No edema  Left lower leg: No edema  Skin:     Coloration: Skin is not jaundiced or pale  Findings: No bruising or erythema  Neurological:      General: No focal deficit present  Mental Status: She is alert and oriented to person, place, and time  Cranial Nerves: No cranial nerve deficit  Sensory: No sensory deficit  Motor: No weakness  Coordination: Coordination normal       Gait: Gait normal       Deep Tendon Reflexes: Reflexes normal    Psychiatric:         Mood and Affect: Mood normal          Behavior: Behavior normal          Thought Content:  Thought content normal          Judgment: Judgment normal  Pt called back b/c of GNR bacteremia after being discharged from the ED; pt is afebrile, w/o leukocytosis or overt physical exam findings suggestive of source  -BCx 9/3: NGTD  -CT A/P showed dilated CBD and porcelain gallbladder  -MRCP showed cholelithiasis w/o choledocholithiasis  -GI: no endoscopic interventions since no active CBD obstruction  -c/w Zosyn (day 4) until repeat cultures are cleared  -TTE ordered to r/o endocarditis Pt called back b/c of GNR bacteremia after being discharged from the ED (now changed to GVR); pt is afebrile, w/o leukocytosis or overt physical exam findings suggestive of source  -BCx 9/3: NGTD  -CT A/P showed dilated CBD and porcelain gallbladder  -MRCP showed cholelithiasis w/o choledocholithiasis  -GI: no endoscopic interventions since no active CBD obstruction  -c/w Zosyn (day 4) until repeat cultures are cleared  -TTE ordered to r/o endocarditis

## 2023-07-26 NOTE — H&P PST ADULT - LAST PROSTATE EXAM
N/A Siliq Counseling:  I discussed with the patient the risks of Siliq including but not limited to new or worsening depression, suicidal thoughts and behavior, immunosuppression, malignancy, posterior leukoencephalopathy syndrome, and serious infections.  The patient understands that monitoring is required including a PPD at baseline and must alert us or the primary physician if symptoms of infection or other concerning signs are noted. There is also a special program designed to monitor depression which is required with Siliq.

## 2023-09-28 NOTE — H&P PST ADULT - VISION (WITH CORRECTIVE LENSES IF THE PATIENT USUALLY WEARS THEM):
Normal vision: sees adequately in most situations; can see medication labels, newsprint Acitretin Pregnancy And Lactation Text: This medication is Pregnancy Category X and should not be given to women who are pregnant or may become pregnant in the future. This medication is excreted in breast milk.

## 2023-12-18 NOTE — ED PROVIDER NOTE - TEMPLATE, MLM
December 18, 2023     Patient: Joanne Melendrez   YOB: 2009   Date of Visit: 12/18/2023       To Whom it May Concern:    Joanne Melendrez was seen in my clinic on 12/18/2023. She is cleared to return to sports, basketball & soccer, with no restrictions. Recommend a gradual ramp up to games.    If you have any questions or concerns, please don't hesitate to call.         Sincerely,          Leonidas Pollack MD        CC: No Recipients  
General

## 2024-10-07 NOTE — PROGRESS NOTE ADULT - PROBLEM SELECTOR PLAN 3
Previously on methadone until 8/16           -continue to monitor  -repleting with KCL PO - monitor pressures  - c/w home dose of atenolol/HCTZ 50/25

## 2025-03-22 ENCOUNTER — NON-APPOINTMENT (OUTPATIENT)
Age: 75
End: 2025-03-22

## 2025-04-23 ENCOUNTER — NON-APPOINTMENT (OUTPATIENT)
Age: 75
End: 2025-04-23

## 2025-06-20 NOTE — DISCHARGE NOTE ADULT - PROVIDER TOKENS
TOKEN:'[id number]',FREE:[LAST:[Coronado],FIRST:[Faustina],PHONE:[phone]],FAX:[(   [phone]    -]]
.